# Patient Record
Sex: MALE | Race: WHITE | Employment: FULL TIME | URBAN - METROPOLITAN AREA
[De-identification: names, ages, dates, MRNs, and addresses within clinical notes are randomized per-mention and may not be internally consistent; named-entity substitution may affect disease eponyms.]

---

## 2018-12-19 ENCOUNTER — HOSPITAL ENCOUNTER (OUTPATIENT)
Age: 38
Setting detail: OBSERVATION
Discharge: HOME | End: 2018-12-22
Attending: EMERGENCY MEDICINE | Admitting: INTERNAL MEDICINE
Payer: COMMERCIAL

## 2018-12-19 ENCOUNTER — APPOINTMENT (OUTPATIENT)
Dept: CT IMAGING | Age: 38
End: 2018-12-19
Payer: COMMERCIAL

## 2018-12-19 DIAGNOSIS — G40.919 BREAKTHROUGH SEIZURE (HCC): Primary | ICD-10-CM

## 2018-12-19 PROBLEM — R56.9 SEIZURE (HCC): Status: ACTIVE | Noted: 2018-12-19

## 2018-12-19 LAB
ACETAMINOPHEN LEVEL: <5 UG/ML (ref 15–30)
ALBUMIN SERPL-MCNC: 4.6 GM/DL (ref 3.4–5)
ALCOHOL SCREEN SERUM: <0.01 %WT/VOL
ALP BLD-CCNC: 97 IU/L (ref 40–129)
ALT SERPL-CCNC: 11 U/L (ref 10–40)
ANION GAP SERPL CALCULATED.3IONS-SCNC: 12 MMOL/L (ref 4–16)
AST SERPL-CCNC: 21 IU/L (ref 15–37)
BASOPHILS ABSOLUTE: 0 K/CU MM
BASOPHILS RELATIVE PERCENT: 0.7 % (ref 0–1)
BILIRUB SERPL-MCNC: 0.2 MG/DL (ref 0–1)
BUN BLDV-MCNC: 14 MG/DL (ref 6–23)
CALCIUM SERPL-MCNC: 9.2 MG/DL (ref 8.3–10.6)
CHLORIDE BLD-SCNC: 102 MMOL/L (ref 99–110)
CO2: 27 MMOL/L (ref 21–32)
CREAT SERPL-MCNC: 0.9 MG/DL (ref 0.9–1.3)
DIFFERENTIAL TYPE: ABNORMAL
EOSINOPHILS ABSOLUTE: 0.2 K/CU MM
EOSINOPHILS RELATIVE PERCENT: 3 % (ref 0–3)
GFR AFRICAN AMERICAN: >60 ML/MIN/1.73M2
GFR NON-AFRICAN AMERICAN: >60 ML/MIN/1.73M2
GLUCOSE BLD-MCNC: 103 MG/DL (ref 70–99)
HCT VFR BLD CALC: 44.3 % (ref 42–52)
HEMOGLOBIN: 14.8 GM/DL (ref 13.5–18)
IMMATURE NEUTROPHIL %: 0.3 % (ref 0–0.43)
LYMPHOCYTES ABSOLUTE: 2.2 K/CU MM
LYMPHOCYTES RELATIVE PERCENT: 37.7 % (ref 24–44)
MAGNESIUM: 2.2 MG/DL (ref 1.8–2.4)
MCH RBC QN AUTO: 30.2 PG (ref 27–31)
MCHC RBC AUTO-ENTMCNC: 33.4 % (ref 32–36)
MCV RBC AUTO: 90.4 FL (ref 78–100)
MONOCYTES ABSOLUTE: 0.4 K/CU MM
MONOCYTES RELATIVE PERCENT: 7 % (ref 0–4)
NUCLEATED RBC %: 0 %
PDW BLD-RTO: 12.5 % (ref 11.7–14.9)
PLATELET # BLD: 205 K/CU MM (ref 140–440)
PMV BLD AUTO: 11.1 FL (ref 7.5–11.1)
POTASSIUM SERPL-SCNC: 4.2 MMOL/L (ref 3.5–5.1)
RBC # BLD: 4.9 M/CU MM (ref 4.6–6.2)
SALICYLATE LEVEL: <0.3 MG/DL (ref 15–30)
SEGMENTED NEUTROPHILS ABSOLUTE COUNT: 2.9 K/CU MM
SEGMENTED NEUTROPHILS RELATIVE PERCENT: 51.3 % (ref 36–66)
SODIUM BLD-SCNC: 141 MMOL/L (ref 135–145)
TOTAL IMMATURE NEUTOROPHIL: 0.02 K/CU MM
TOTAL NUCLEATED RBC: 0 K/CU MM
TOTAL PROTEIN: 7.3 GM/DL (ref 6.4–8.2)
TOTAL RETICULOCYTE COUNT: 0.09 K/CU MM
TSH HIGH SENSITIVITY: 2.76 UIU/ML (ref 0.27–4.2)
VALPROIC ACID LEVEL: 73.8 UG/ML (ref 50–100)
WBC # BLD: 5.7 K/CU MM (ref 4–10.5)

## 2018-12-19 PROCEDURE — G0378 HOSPITAL OBSERVATION PER HR: HCPCS

## 2018-12-19 PROCEDURE — 84443 ASSAY THYROID STIM HORMONE: CPT

## 2018-12-19 PROCEDURE — 83735 ASSAY OF MAGNESIUM: CPT

## 2018-12-19 PROCEDURE — 2580000003 HC RX 258: Performed by: INTERNAL MEDICINE

## 2018-12-19 PROCEDURE — 80164 ASSAY DIPROPYLACETIC ACD TOT: CPT

## 2018-12-19 PROCEDURE — 96372 THER/PROPH/DIAG INJ SC/IM: CPT

## 2018-12-19 PROCEDURE — 85025 COMPLETE CBC W/AUTO DIFF WBC: CPT

## 2018-12-19 PROCEDURE — 80053 COMPREHEN METABOLIC PANEL: CPT

## 2018-12-19 PROCEDURE — G0480 DRUG TEST DEF 1-7 CLASSES: HCPCS

## 2018-12-19 PROCEDURE — 70450 CT HEAD/BRAIN W/O DYE: CPT

## 2018-12-19 PROCEDURE — 6360000002 HC RX W HCPCS: Performed by: INTERNAL MEDICINE

## 2018-12-19 PROCEDURE — 99285 EMERGENCY DEPT VISIT HI MDM: CPT

## 2018-12-19 PROCEDURE — 93005 ELECTROCARDIOGRAM TRACING: CPT | Performed by: EMERGENCY MEDICINE

## 2018-12-19 RX ORDER — DIVALPROEX SODIUM 500 MG/1
500 TABLET, EXTENDED RELEASE ORAL DAILY
Status: DISCONTINUED | OUTPATIENT
Start: 2018-12-20 | End: 2018-12-20

## 2018-12-19 RX ORDER — SODIUM CHLORIDE 0.9 % (FLUSH) 0.9 %
10 SYRINGE (ML) INJECTION EVERY 12 HOURS SCHEDULED
Status: DISCONTINUED | OUTPATIENT
Start: 2018-12-19 | End: 2018-12-22 | Stop reason: HOSPADM

## 2018-12-19 RX ORDER — ONDANSETRON 2 MG/ML
4 INJECTION INTRAMUSCULAR; INTRAVENOUS EVERY 6 HOURS PRN
Status: DISCONTINUED | OUTPATIENT
Start: 2018-12-19 | End: 2018-12-22 | Stop reason: HOSPADM

## 2018-12-19 RX ORDER — SODIUM CHLORIDE 0.9 % (FLUSH) 0.9 %
10 SYRINGE (ML) INJECTION PRN
Status: DISCONTINUED | OUTPATIENT
Start: 2018-12-19 | End: 2018-12-22 | Stop reason: HOSPADM

## 2018-12-19 RX ORDER — LORAZEPAM 2 MG/ML
2 INJECTION INTRAMUSCULAR EVERY 4 HOURS PRN
Status: DISCONTINUED | OUTPATIENT
Start: 2018-12-19 | End: 2018-12-22 | Stop reason: HOSPADM

## 2018-12-19 RX ORDER — SODIUM CHLORIDE 9 MG/ML
INJECTION, SOLUTION INTRAVENOUS CONTINUOUS
Status: DISCONTINUED | OUTPATIENT
Start: 2018-12-19 | End: 2018-12-22 | Stop reason: HOSPADM

## 2018-12-19 RX ORDER — AMOXICILLIN 500 MG/1
500 CAPSULE ORAL 4 TIMES DAILY
Refills: 0 | Status: ON HOLD | COMMUNITY
Start: 2018-12-14 | End: 2018-12-21 | Stop reason: HOSPADM

## 2018-12-19 RX ORDER — ALLOPURINOL 300 MG/1
300 TABLET ORAL 2 TIMES DAILY
COMMUNITY
End: 2019-05-11 | Stop reason: SDUPTHER

## 2018-12-19 RX ADMIN — SODIUM CHLORIDE, PRESERVATIVE FREE 10 ML: 5 INJECTION INTRAVENOUS at 22:00

## 2018-12-19 RX ADMIN — SODIUM CHLORIDE: 9 INJECTION, SOLUTION INTRAVENOUS at 21:57

## 2018-12-19 RX ADMIN — ENOXAPARIN SODIUM 40 MG: 40 INJECTION SUBCUTANEOUS at 22:00

## 2018-12-19 ASSESSMENT — PAIN SCALES - GENERAL
PAINLEVEL_OUTOF10: 0
PAINLEVEL_OUTOF10: 0

## 2018-12-19 NOTE — PROGRESS NOTES
Medication History  [facility]    Patient Name: Allen Collins 1980     Medication history has been completed by: Kedar Simmons CPhT    Source(s) of information: patient and insurance claims     Primary Care Physician: Isabella Ling PA-C     Pharmacy: [facility]    Allergies as of 12/19/2018    (No Known Allergies)        Prior to Admission medications    Medication Sig Start Date End Date Taking? Authorizing Provider   amoxicillin (AMOXIL) 500 MG capsule Take 500 mg by mouth 4 times daily 12/14/18  Yes Historical Provider, MD   divalproex (DEPAKOTE ER) 1500 MG ER tablet Take 1500 mg by mouth daily    Yes Historical Provider, MD   ibuprofen (ADVIL;MOTRIN) 200 MG tablet Take 200 mg by mouth every 6 hours as needed for Pain  12/19/18  Historical Provider, MD   topiramate (TOPAMAX) 50 MG tablet Take 50 mg by mouth nightly  12/19/18  Historical Provider, MD       Medications added or changed (ex.  new medication, dosage change, interval change, formulation change):  · Amoxicillin 500 mg 4 times daily (added)  · Divalproex ER 1500 mg daily dosage clarified  · Allopurinol 300 mg BID (added)    Medications removed from list (include reason, ex. noncompliance, medication cost, therapy complete etc.):   · IBU patient states he does not take this  · Topiramate no data claims listed    Medications requiring reconciliation with provider:   ·  Divalproex ER 1500 mg daily dosage clarified  · Allopurinol 300 mg BID (added)    Other Comments:  · Medication list reviewed with patient along with insurance claims  · Patient states he takes allopurinol last fill date 08/31/18     To my knowledge the above medication history is accurate as of 12/19/2018 5:14 PM.   Kedar Simmons CPhT   12/19/2018 5:14 PM

## 2018-12-19 NOTE — ED PROVIDER NOTES
-14 degrees    T Axis -12 degrees    Diagnosis       Normal sinus rhythm  Moderate voltage criteria for LVH, may be normal variant  Inferior infarct , age undetermined  Abnormal ECG  No previous ECGs available        Radiographs (if obtained):  [] The following radiograph was interpreted by myself in the absence of a radiologist:   [] Radiologist's Report Reviewed:  CT Head WO Contrast   Final Result   1. No acute intracranial abnormality. EKG (if obtained): (All EKG's are interpreted by myself in the absence of a cardiologist)    Chart review shows recent radiographs:  Ct Head Wo Contrast    Result Date: 12/19/2018  EXAMINATION: CT OF THE HEAD WITHOUT CONTRAST  12/19/2018 2:15 pm TECHNIQUE: CT of the head was performed without the administration of intravenous contrast. Dose modulation, iterative reconstruction, and/or weight based adjustment of the mA/kV was utilized to reduce the radiation dose to as low as reasonably achievable. COMPARISON: 11/02/2007. HISTORY: ORDERING SYSTEM PROVIDED HISTORY: possible seizure TECHNOLOGIST PROVIDED HISTORY: Has a \"code stroke\" or \"stroke alert\" been called? ->No Ordering Physician Provided Reason for Exam: POSSIBLE SEIZURE Acuity: Unknown Type of Exam: Unknown Additional signs and symptoms: PT STATES HE HAS HAD SEIZURES IN THE PAST Relevant Medical/Surgical History: NONE FINDINGS: BRAIN/VENTRICLES: There is no acute intracranial hemorrhage, mass effect or midline shift. No abnormal extra-axial fluid collection. The gray-white differentiation is maintained without evidence of an acute infarct. There is no evidence of hydrocephalus. ORBITS: The visualized portion of the orbits demonstrate no acute abnormality. SINUSES: The visualized paranasal sinuses and mastoid air cells demonstrate no acute abnormality. SOFT TISSUES/SKULL:  No acute abnormality of the visualized skull or soft tissues. 1.No acute intracranial abnormality.        MDM:  Patient here for possible

## 2018-12-19 NOTE — H&P
History and Physical      Name:  Imelda Akhtar /Age/Sex: 1980  (45 y.o. male)   MRN & CSN:  [id number] & [id number] Admission Date/Time: 2018  1:15 PM   Location:  ED26/ED-26 PCP: Gilda Coelho PA-C       Hospital Day: 1    Assessment and Plan:   Imelda Akhtar is a 45 y.o.  male  who presents with Seizure ([facility] [address]    1. Seizure Disorder: has been having episodes of blank stares. No real jerky movements. On Depakote. Last seizure episode was 10 years ago. Rule out trigger like infection and electrolyte abnormalities. May need EEG as outpatient. Neurology as outpatient   2. Acute Encephalopathy: presented with blank stares. Could be post ictal. CT Head no acute issues. UA. TSH. Neurology on consult    Diet     DVT Prophylaxis [x] Lovenox, []  Heparin, [] SCDs, [] Warfarin  [] NOAC     GI Prophylaxis [] PPI,  [] H2 Blocker,  [] Carafate,  [x] Diet/Tube Feeds   Code Status No Order   MDM [] Low, [x] Moderate,[]  High     History of Present Illness:     Chief Complaint: Seizure ([address]  Imelda Akhtar is a 45 y.o.  male, with past medical history significant for seizure disorder, who presented to the ED from home due blank started. He is a poor historian. History obtained from the chart. The present condition started in the last few days as episodes of blanks stares. He has no jerky movements. He denied urinary or bowel incontinence. He was also noted to be forgetful, missing his appointments. The patient was brought to the ED and was subsequently admitted. At the ED, vital signs were stable. He has no electrolyte abnormalities. CT Head with no acute intracranial process. Ten point ROS reviewed negative, unless as noted above    Objective:   No intake or output data in the 24 hours ending 18 1718   Vitals:   Vitals:    18 1602   BP: (!) 99/56   Pulse: 60   Resp: 14   Temp:    SpO2:      Physical Exam:   GEN Awake male, sitting upright in bed in no apparent distress.  Appears given

## 2018-12-20 LAB
ALBUMIN SERPL-MCNC: 4.6 GM/DL (ref 3.4–5)
ALP BLD-CCNC: 93 IU/L (ref 40–128)
ALT SERPL-CCNC: 10 U/L (ref 10–40)
ANION GAP SERPL CALCULATED.3IONS-SCNC: 14 MMOL/L (ref 4–16)
AST SERPL-CCNC: 18 IU/L (ref 15–37)
BASOPHILS ABSOLUTE: 0 K/CU MM
BASOPHILS RELATIVE PERCENT: 0.3 % (ref 0–1)
BILIRUB SERPL-MCNC: 0.2 MG/DL (ref 0–1)
BUN BLDV-MCNC: 17 MG/DL (ref 6–23)
CALCIUM SERPL-MCNC: 9.1 MG/DL (ref 8.3–10.6)
CHLORIDE BLD-SCNC: 104 MMOL/L (ref 99–110)
CO2: 26 MMOL/L (ref 21–32)
CREAT SERPL-MCNC: 1 MG/DL (ref 0.9–1.3)
DIFFERENTIAL TYPE: ABNORMAL
EOSINOPHILS ABSOLUTE: 0.2 K/CU MM
EOSINOPHILS RELATIVE PERCENT: 3.2 % (ref 0–3)
GFR AFRICAN AMERICAN: >60 ML/MIN/1.73M2
GFR NON-AFRICAN AMERICAN: >60 ML/MIN/1.73M2
GLUCOSE BLD-MCNC: 104 MG/DL (ref 70–99)
HCT VFR BLD CALC: 42.6 % (ref 42–52)
HEMOGLOBIN: 14 GM/DL (ref 13.5–18)
IMMATURE NEUTROPHIL %: 0.2 % (ref 0–0.43)
LYMPHOCYTES ABSOLUTE: 2.5 K/CU MM
LYMPHOCYTES RELATIVE PERCENT: 42.2 % (ref 24–44)
MCH RBC QN AUTO: 30.1 PG (ref 27–31)
MCHC RBC AUTO-ENTMCNC: 32.9 % (ref 32–36)
MCV RBC AUTO: 91.6 FL (ref 78–100)
MONOCYTES ABSOLUTE: 0.4 K/CU MM
MONOCYTES RELATIVE PERCENT: 7.3 % (ref 0–4)
NUCLEATED RBC %: 0 %
PDW BLD-RTO: 12.4 % (ref 11.7–14.9)
PLATELET # BLD: 200 K/CU MM (ref 140–440)
PMV BLD AUTO: 11.4 FL (ref 7.5–11.1)
POTASSIUM SERPL-SCNC: 4.4 MMOL/L (ref 3.5–5.1)
RBC # BLD: 4.65 M/CU MM (ref 4.6–6.2)
SEGMENTED NEUTROPHILS ABSOLUTE COUNT: 2.8 K/CU MM
SEGMENTED NEUTROPHILS RELATIVE PERCENT: 46.8 % (ref 36–66)
SODIUM BLD-SCNC: 144 MMOL/L (ref 135–145)
TOTAL IMMATURE NEUTOROPHIL: 0.01 K/CU MM
TOTAL NUCLEATED RBC: 0 K/CU MM
TOTAL PROTEIN: 6.6 GM/DL (ref 6.4–8.2)
WBC # BLD: 5.9 K/CU MM (ref 4–10.5)

## 2018-12-20 PROCEDURE — 93010 ELECTROCARDIOGRAM REPORT: CPT | Performed by: INTERNAL MEDICINE

## 2018-12-20 PROCEDURE — G0378 HOSPITAL OBSERVATION PER HR: HCPCS

## 2018-12-20 PROCEDURE — 85025 COMPLETE CBC W/AUTO DIFF WBC: CPT

## 2018-12-20 PROCEDURE — 2580000003 HC RX 258: Performed by: INTERNAL MEDICINE

## 2018-12-20 PROCEDURE — 94761 N-INVAS EAR/PLS OXIMETRY MLT: CPT

## 2018-12-20 PROCEDURE — 36415 COLL VENOUS BLD VENIPUNCTURE: CPT

## 2018-12-20 PROCEDURE — 80053 COMPREHEN METABOLIC PANEL: CPT

## 2018-12-20 PROCEDURE — 6370000000 HC RX 637 (ALT 250 FOR IP): Performed by: INTERNAL MEDICINE

## 2018-12-20 PROCEDURE — 6370000000 HC RX 637 (ALT 250 FOR IP): Performed by: PSYCHIATRY & NEUROLOGY

## 2018-12-20 RX ORDER — DIVALPROEX SODIUM 500 MG/1
2000 TABLET, EXTENDED RELEASE ORAL DAILY
Status: DISCONTINUED | OUTPATIENT
Start: 2018-12-20 | End: 2018-12-21

## 2018-12-20 RX ADMIN — SODIUM CHLORIDE: 9 INJECTION, SOLUTION INTRAVENOUS at 19:21

## 2018-12-20 RX ADMIN — DIVALPROEX SODIUM 500 MG: 500 TABLET, FILM COATED, EXTENDED RELEASE ORAL at 09:23

## 2018-12-20 RX ADMIN — DIVALPROEX SODIUM 2000 MG: 500 TABLET, FILM COATED, EXTENDED RELEASE ORAL at 16:43

## 2018-12-20 RX ADMIN — SODIUM CHLORIDE: 9 INJECTION, SOLUTION INTRAVENOUS at 09:23

## 2018-12-20 ASSESSMENT — PAIN SCALES - GENERAL
PAINLEVEL_OUTOF10: 0

## 2018-12-20 NOTE — PLAN OF CARE
Problem: Infection:  Goal: Will remain free from infection  Will remain free from infection   Outcome: Ongoing      Problem: Safety:  Goal: Free from accidental physical injury  Free from accidental physical injury   Outcome: Ongoing    Goal: Free from intentional harm  Free from intentional harm   Outcome: Ongoing      Problem: Daily Care:  Goal: Daily care needs are met  Daily care needs are met   Outcome: Ongoing      Problem: Pain:  Goal: Patient's pain/discomfort is manageable  Patient's pain/discomfort is manageable   Outcome: Ongoing      Problem: Skin Integrity:  Goal: Skin integrity will stabilize  Skin integrity will stabilize   Outcome: Ongoing      Problem: Discharge Planning:  Goal: Patients continuum of care needs are met  Patients continuum of care needs are met   Outcome: Ongoing      Problem: Health Behavior:  Goal: Ability to manage health-related needs will improve  Ability to manage health-related needs will improve   Outcome: Ongoing      Problem: Physical Regulation:  Goal: Signs of adequate cerebral perfusion will increase  Signs of adequate cerebral perfusion will increase   Outcome: Ongoing    Goal: Ability to maintain a stable neurologic state will improve  Ability to maintain a stable neurologic state will improve   Outcome: Ongoing      Problem: Falls - Risk of:  Goal: Will remain free from falls  Will remain free from falls   Outcome: Ongoing    Goal: Absence of physical injury  Absence of physical injury   Outcome: Ongoing

## 2018-12-20 NOTE — CONSULTS
family history indicates:  Problem: Arthritis      Relation: Mother       Age of Onset: (Not Specified)   Problem: Lupus      Relation: Mother       Age of Onset: (Not Specified)   Problem: Diabetes      Relation: Father       Age of Onset: (Not Specified)   Problem: Coronary Art Dis      Relation: Father       Age of Onset: (Not Specified)   Problem: Colon Cancer      Relation: Paternal Uncle       Age of Onset: (Not Specified)       REVIEW OF SYSTEMS:  CONSTITUTIONAL:  negative  HEENT:  negative  RESPIRATORY:  negative  CARDIOVASCULAR:  negative  GASTROINTESTINAL:  negative  GENITOURINARY:  negative  MUSCULOSKELETAL:  negative  BEHAVIOR/PSYCH:  Negative    ROS neg    Family hx neg    PHYSICAL EXAM  ------------------------  Vitals:  /65   Pulse 68   Temp 98.3 °F (36.8 °C) (Oral)   Resp 12   Ht 5' 6\" (1.676 m)   Wt 190 lb 12.8 oz (86.5 kg)   SpO2 95%   BMI 30.80 kg/m²      General:  drowsy oriented X 3. Well developed, well nourished, well groomed. No apparent distress. HEENT:  Normocephalic, atraumatic. Pupils equal, round, reactive to light. No scleral icterus. No conjunctival injection. Normal lips, teeth, and gums. No nasal discharge. Neck:  Supple  Heart:  RRR, no murmurs, gallops, rubs  Lungs:  CTA bilaterally, bilat symmetrical expansion, no wheeze, rales, or rhonchi  Abdomen: Bowel sounds present, soft, nontender, no masses, no organomegaly, no peritoneal signs  Extremities:  No clubbing, cyanosis, or edema  Skin:  Warm and dry, no open lesions or rash  Breast: deferred  Rectal: deferred  Genitalia:  deferred    NEUROLOGICAL EXAM  ---------------------------------    Mental Status Exam:             drowsy and oriented times three,follows commands,speech and language mildly confused and drowsy    Cranial [id number] Intact.         Cranial nerve II           Visual acuity:  normal                 Cranial nerve III           Pupils:  equal, round, reactive to light      Cranial nerves III, IV, VI           Extraocular Movements: intact      Cranial nerve V           Facial sensation:  intact      Cranial nerve VII           Facial strength: intact      Cranial nerve VIII           Hearing:  intact      Cranial nerve IX           Palate:  intact      Cranial nerve XI         Shoulder shrug:  intact      Cranial nerve XII          Tongue movement:  normal    Motor:    Drift:  absent  Motor exam is symmetrical 5 out of 5 all extremities bilaterally  Tone:  normal  Abnormal Movements:  Absent    DTRs-2+ biceps,triceps,brachioradialis,knee jerks and ankle jerks bilaterally symmetrical.  Toes-downgoing bilaterally            Sensory:normal sensation              CBC with Differential:  Lab Results       Component                Value               Date                       WBC                      5.9                 12/20/2018                 RBC                      4.65                12/20/2018                 HGB                      14.0                12/20/2018                 HCT                      42.6                12/20/2018                 PLT                      200                 12/20/2018                 MCV                      91.6                12/20/2018                 MCH                      30.1                12/20/2018                 MCHC                     32.9                12/20/2018                 RDW                      12.4                12/20/2018                 SEGSPCT                  46.8                12/20/2018                 LYMPHOPCT                42.2                12/20/2018                 MONOPCT                  7.3                 12/20/2018                 BASOPCT                  0.3                 12/20/2018                 MONOSABS                 0.4                 12/20/2018                 LYMPHSABS                2.5                 12/20/2018                 EOSABS                   0.2                 12/20/2018

## 2018-12-20 NOTE — CARE COORDINATION
Patient screened for discharge needs with no needs identified at this time. Pt is from home with family, has PCP, insurance with RX coverage and was independent prior to admission. However, CM available if needs arise.

## 2018-12-21 ENCOUNTER — APPOINTMENT (OUTPATIENT)
Dept: MRI IMAGING | Age: 38
End: 2018-12-21
Payer: COMMERCIAL

## 2018-12-21 LAB
ALBUMIN SERPL-MCNC: 4 GM/DL (ref 3.4–5)
ALP BLD-CCNC: 80 IU/L (ref 40–129)
ALT SERPL-CCNC: 8 U/L (ref 10–40)
AMMONIA: 60 UMOL/L (ref 16–60)
AMPHETAMINES: NEGATIVE
ANION GAP SERPL CALCULATED.3IONS-SCNC: 10 MMOL/L (ref 4–16)
AST SERPL-CCNC: 14 IU/L (ref 15–37)
BACTERIA: NEGATIVE /HPF
BARBITURATE SCREEN URINE: NEGATIVE
BASOPHILS ABSOLUTE: 0 K/CU MM
BASOPHILS RELATIVE PERCENT: 0.6 % (ref 0–1)
BENZODIAZEPINE SCREEN, URINE: NEGATIVE
BILIRUB SERPL-MCNC: 0.2 MG/DL (ref 0–1)
BILIRUBIN URINE: NEGATIVE MG/DL
BLOOD, URINE: NEGATIVE
BUN BLDV-MCNC: 12 MG/DL (ref 6–23)
CALCIUM SERPL-MCNC: 8.6 MG/DL (ref 8.3–10.6)
CANNABINOID SCREEN URINE: NEGATIVE
CHLORIDE BLD-SCNC: 107 MMOL/L (ref 99–110)
CLARITY: CLEAR
CO2: 27 MMOL/L (ref 21–32)
COCAINE METABOLITE: NEGATIVE
COLOR: YELLOW
CREAT SERPL-MCNC: 0.9 MG/DL (ref 0.9–1.3)
DIFFERENTIAL TYPE: ABNORMAL
EOSINOPHILS ABSOLUTE: 0.2 K/CU MM
EOSINOPHILS RELATIVE PERCENT: 3.1 % (ref 0–3)
FOLATE: 10.6 NG/ML (ref 3.1–17.5)
GFR AFRICAN AMERICAN: >60 ML/MIN/1.73M2
GFR NON-AFRICAN AMERICAN: >60 ML/MIN/1.73M2
GLUCOSE BLD-MCNC: 94 MG/DL (ref 70–99)
GLUCOSE, URINE: NEGATIVE MG/DL
HCT VFR BLD CALC: 38.4 % (ref 42–52)
HEMOGLOBIN: 12.7 GM/DL (ref 13.5–18)
IMMATURE NEUTROPHIL %: 0.4 % (ref 0–0.43)
KETONES, URINE: NEGATIVE MG/DL
LEUKOCYTE ESTERASE, URINE: NEGATIVE
LYMPHOCYTES ABSOLUTE: 2.5 K/CU MM
LYMPHOCYTES RELATIVE PERCENT: 47.8 % (ref 24–44)
MCH RBC QN AUTO: 30.2 PG (ref 27–31)
MCHC RBC AUTO-ENTMCNC: 33.1 % (ref 32–36)
MCV RBC AUTO: 91.4 FL (ref 78–100)
MONOCYTES ABSOLUTE: 0.3 K/CU MM
MONOCYTES RELATIVE PERCENT: 6.1 % (ref 0–4)
MUCUS: ABNORMAL HPF
NITRITE URINE, QUANTITATIVE: NEGATIVE
NUCLEATED RBC %: 0 %
OPIATES, URINE: NEGATIVE
OXYCODONE: NEGATIVE
PDW BLD-RTO: 12.4 % (ref 11.7–14.9)
PH, URINE: 5 (ref 5–8)
PHENCYCLIDINE, URINE: NEGATIVE
PLATELET # BLD: 175 K/CU MM (ref 140–440)
PMV BLD AUTO: 11.2 FL (ref 7.5–11.1)
POTASSIUM SERPL-SCNC: 4.3 MMOL/L (ref 3.5–5.1)
PROTEIN UA: NEGATIVE MG/DL
RBC # BLD: 4.2 M/CU MM (ref 4.6–6.2)
RBC URINE: 2 /HPF (ref 0–3)
SEGMENTED NEUTROPHILS ABSOLUTE COUNT: 2.2 K/CU MM
SEGMENTED NEUTROPHILS RELATIVE PERCENT: 42 % (ref 36–66)
SODIUM BLD-SCNC: 144 MMOL/L (ref 135–145)
SPECIFIC GRAVITY UA: 1.01 (ref 1–1.03)
TOTAL IMMATURE NEUTOROPHIL: 0.02 K/CU MM
TOTAL NUCLEATED RBC: 0 K/CU MM
TOTAL PROTEIN: 5.9 GM/DL (ref 6.4–8.2)
TRICHOMONAS: ABNORMAL /HPF
UROBILINOGEN, URINE: NORMAL MG/DL (ref 0.2–1)
VITAMIN B-12: 786 PG/ML (ref 211–911)
WBC # BLD: 5.2 K/CU MM (ref 4–10.5)
WBC UA: <1 /HPF (ref 0–2)

## 2018-12-21 PROCEDURE — 6360000002 HC RX W HCPCS: Performed by: PSYCHIATRY & NEUROLOGY

## 2018-12-21 PROCEDURE — 82746 ASSAY OF FOLIC ACID SERUM: CPT

## 2018-12-21 PROCEDURE — 96372 THER/PROPH/DIAG INJ SC/IM: CPT

## 2018-12-21 PROCEDURE — G0378 HOSPITAL OBSERVATION PER HR: HCPCS

## 2018-12-21 PROCEDURE — 80307 DRUG TEST PRSMV CHEM ANLYZR: CPT

## 2018-12-21 PROCEDURE — 85025 COMPLETE CBC W/AUTO DIFF WBC: CPT

## 2018-12-21 PROCEDURE — 6370000000 HC RX 637 (ALT 250 FOR IP): Performed by: PSYCHIATRY & NEUROLOGY

## 2018-12-21 PROCEDURE — 6360000002 HC RX W HCPCS: Performed by: INTERNAL MEDICINE

## 2018-12-21 PROCEDURE — 82607 VITAMIN B-12: CPT

## 2018-12-21 PROCEDURE — 36415 COLL VENOUS BLD VENIPUNCTURE: CPT

## 2018-12-21 PROCEDURE — 2580000003 HC RX 258: Performed by: INTERNAL MEDICINE

## 2018-12-21 PROCEDURE — 80053 COMPREHEN METABOLIC PANEL: CPT

## 2018-12-21 PROCEDURE — 96374 THER/PROPH/DIAG INJ IV PUSH: CPT

## 2018-12-21 PROCEDURE — 82140 ASSAY OF AMMONIA: CPT

## 2018-12-21 PROCEDURE — 70551 MRI BRAIN STEM W/O DYE: CPT

## 2018-12-21 PROCEDURE — 81001 URINALYSIS AUTO W/SCOPE: CPT

## 2018-12-21 PROCEDURE — 96376 TX/PRO/DX INJ SAME DRUG ADON: CPT

## 2018-12-21 PROCEDURE — 94761 N-INVAS EAR/PLS OXIMETRY MLT: CPT

## 2018-12-21 RX ORDER — DIVALPROEX SODIUM 500 MG/1
2000 TABLET, EXTENDED RELEASE ORAL
Status: DISCONTINUED | OUTPATIENT
Start: 2018-12-22 | End: 2018-12-22 | Stop reason: HOSPADM

## 2018-12-21 RX ORDER — LORAZEPAM 2 MG/ML
2 INJECTION INTRAMUSCULAR 2 TIMES DAILY
Status: DISCONTINUED | OUTPATIENT
Start: 2018-12-21 | End: 2018-12-22 | Stop reason: HOSPADM

## 2018-12-21 RX ORDER — DIVALPROEX SODIUM 500 MG/1
2000 TABLET, EXTENDED RELEASE ORAL DAILY
Qty: 30 TABLET | Refills: 3 | Status: SHIPPED | OUTPATIENT
Start: 2018-12-22 | End: 2018-12-22 | Stop reason: HOSPADM

## 2018-12-21 RX ADMIN — SODIUM CHLORIDE: 9 INJECTION, SOLUTION INTRAVENOUS at 21:52

## 2018-12-21 RX ADMIN — ENOXAPARIN SODIUM 40 MG: 40 INJECTION SUBCUTANEOUS at 21:52

## 2018-12-21 RX ADMIN — SODIUM CHLORIDE, PRESERVATIVE FREE 10 ML: 5 INJECTION INTRAVENOUS at 21:52

## 2018-12-21 RX ADMIN — DIVALPROEX SODIUM 2000 MG: 500 TABLET, FILM COATED, EXTENDED RELEASE ORAL at 09:44

## 2018-12-21 RX ADMIN — SODIUM CHLORIDE, PRESERVATIVE FREE 10 ML: 5 INJECTION INTRAVENOUS at 00:09

## 2018-12-21 RX ADMIN — SODIUM CHLORIDE: 9 INJECTION, SOLUTION INTRAVENOUS at 05:52

## 2018-12-21 RX ADMIN — LORAZEPAM 2 MG: 2 INJECTION, SOLUTION INTRAMUSCULAR; INTRAVENOUS at 07:06

## 2018-12-21 RX ADMIN — LORAZEPAM 2 MG: 2 INJECTION, SOLUTION INTRAMUSCULAR; INTRAVENOUS at 16:55

## 2018-12-21 RX ADMIN — ENOXAPARIN SODIUM 40 MG: 40 INJECTION SUBCUTANEOUS at 00:09

## 2018-12-21 NOTE — PROGRESS NOTES
This nurse spoke with the pt wife multiple times this shift and updated her regarding his condition, any tests that were done, and all meds that he has been given. Pt wife has been very upset, saying that pt has not been given the correct care. Dr. Lenny Menchaca and Dr. Ralph Whitfield have been informed and have both spoken with the pt wife.

## 2018-12-21 NOTE — DISCHARGE SUMMARY
11.2 oz (88.8 kg)   SpO2 96%   BMI 31.59 kg/m²            GEN    Awake male, sitting upright in bed in no apparent distress. Appears given age. EYES   Pupils are equally round.  No scleral erythema, discharge, or conjunctivitis. HENT  Mucous membranes are moist. Oral pharynx without exudates, no evidence of thrush. NECK  Supple, no apparent thyromegaly or masses. RESP  Clear to auscultation, no wheezes, rales or rhonchi.  Symmetric chest movement while on room air. CARDIO/VASC           S1/S2 auscultated. Regular rate without appreciable murmurs, rubs, or gallops. No JVD or carotid bruits. Peripheral pulses equal bilaterally and palpable. No peripheral edema. Ulyess Anis is soft without significant tenderness, masses, or guarding. Bowel sounds are normoactive. Rectal exam deferred. MSK    No gross joint deformities. SKIN    Normal coloration, warm, dry. NEURO           Cranial nerves appear grossly intact, normal speech, no lateralizing weakness. PSYCH            Awake, slow to respond. BMP/CBC  Recent Labs      12/19/18   1336  12/20/18   0342  12/21/18   0429   NA  141  144  144   K  4.2  4.4  4.3   CL  102  104  107   CO2  27  26  27   BUN  14  17  12   CREATININE  0.9  1.0  0.9   WBC  5.7  5.9  5.2   HCT  44.3  42.6  38.4*   PLT  205  200  175       IMAGING:  Ct Head Wo Contrast    Result Date: 12/19/2018  EXAMINATION: CT OF THE HEAD WITHOUT CONTRAST  12/19/2018 2:15 pm TECHNIQUE: CT of the head was performed without the administration of intravenous contrast. Dose modulation, iterative reconstruction, and/or weight based adjustment of the mA/kV was utilized to reduce the radiation dose to as low as reasonably achievable. COMPARISON: 11/02/2007. HISTORY: ORDERING SYSTEM PROVIDED HISTORY: possible seizure TECHNOLOGIST PROVIDED HISTORY: Has a \"code stroke\" or \"stroke alert\" been called? ->No Ordering Physician Provided Reason for Exam: POSSIBLE SEIZURE Acuity: Unknown Type of Exam: Unknown

## 2018-12-22 VITALS
HEIGHT: 66 IN | HEART RATE: 74 BPM | BODY MASS INDEX: 31.45 KG/M2 | OXYGEN SATURATION: 97 % | DIASTOLIC BLOOD PRESSURE: 80 MMHG | TEMPERATURE: 97.7 F | SYSTOLIC BLOOD PRESSURE: 123 MMHG | RESPIRATION RATE: 13 BRPM | WEIGHT: 195.7 LBS

## 2018-12-22 LAB
ALBUMIN SERPL-MCNC: 4.1 GM/DL (ref 3.4–5)
ALP BLD-CCNC: 77 IU/L (ref 40–128)
ALT SERPL-CCNC: 9 U/L (ref 10–40)
ANION GAP SERPL CALCULATED.3IONS-SCNC: 11 MMOL/L (ref 4–16)
AST SERPL-CCNC: 15 IU/L (ref 15–37)
BASOPHILS ABSOLUTE: 0 K/CU MM
BASOPHILS RELATIVE PERCENT: 0.4 % (ref 0–1)
BILIRUB SERPL-MCNC: 0.2 MG/DL (ref 0–1)
BUN BLDV-MCNC: 9 MG/DL (ref 6–23)
CALCIUM SERPL-MCNC: 8.5 MG/DL (ref 8.3–10.6)
CHLORIDE BLD-SCNC: 105 MMOL/L (ref 99–110)
CO2: 26 MMOL/L (ref 21–32)
CREAT SERPL-MCNC: 0.8 MG/DL (ref 0.9–1.3)
DIFFERENTIAL TYPE: ABNORMAL
EOSINOPHILS ABSOLUTE: 0.2 K/CU MM
EOSINOPHILS RELATIVE PERCENT: 2.7 % (ref 0–3)
GFR AFRICAN AMERICAN: >60 ML/MIN/1.73M2
GFR NON-AFRICAN AMERICAN: >60 ML/MIN/1.73M2
GLUCOSE BLD-MCNC: 92 MG/DL (ref 70–99)
HCT VFR BLD CALC: 39.7 % (ref 42–52)
HEMOGLOBIN: 13 GM/DL (ref 13.5–18)
IMMATURE NEUTROPHIL %: 0.5 % (ref 0–0.43)
LYMPHOCYTES ABSOLUTE: 2.6 K/CU MM
LYMPHOCYTES RELATIVE PERCENT: 46.5 % (ref 24–44)
MCH RBC QN AUTO: 29.7 PG (ref 27–31)
MCHC RBC AUTO-ENTMCNC: 32.7 % (ref 32–36)
MCV RBC AUTO: 90.8 FL (ref 78–100)
MONOCYTES ABSOLUTE: 0.3 K/CU MM
MONOCYTES RELATIVE PERCENT: 5.2 % (ref 0–4)
NUCLEATED RBC %: 0 %
PDW BLD-RTO: 12.3 % (ref 11.7–14.9)
PLATELET # BLD: 183 K/CU MM (ref 140–440)
PMV BLD AUTO: 10.7 FL (ref 7.5–11.1)
POTASSIUM SERPL-SCNC: 4.4 MMOL/L (ref 3.5–5.1)
RBC # BLD: 4.37 M/CU MM (ref 4.6–6.2)
SEGMENTED NEUTROPHILS ABSOLUTE COUNT: 2.5 K/CU MM
SEGMENTED NEUTROPHILS RELATIVE PERCENT: 44.7 % (ref 36–66)
SODIUM BLD-SCNC: 142 MMOL/L (ref 135–145)
TOTAL IMMATURE NEUTOROPHIL: 0.03 K/CU MM
TOTAL NUCLEATED RBC: 0 K/CU MM
TOTAL PROTEIN: 5.9 GM/DL (ref 6.4–8.2)
VALPROIC ACID LEVEL: 105.8 UG/ML (ref 50–100)
WBC # BLD: 5.6 K/CU MM (ref 4–10.5)

## 2018-12-22 PROCEDURE — 36415 COLL VENOUS BLD VENIPUNCTURE: CPT

## 2018-12-22 PROCEDURE — 85025 COMPLETE CBC W/AUTO DIFF WBC: CPT

## 2018-12-22 PROCEDURE — 80053 COMPREHEN METABOLIC PANEL: CPT

## 2018-12-22 PROCEDURE — G0378 HOSPITAL OBSERVATION PER HR: HCPCS

## 2018-12-22 PROCEDURE — 80164 ASSAY DIPROPYLACETIC ACD TOT: CPT

## 2018-12-22 RX ORDER — DIVALPROEX SODIUM 500 MG/1
2000 TABLET, EXTENDED RELEASE ORAL
Qty: 30 TABLET | Refills: 3 | Status: SHIPPED | OUTPATIENT
Start: 2018-12-22 | End: 2019-09-09
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
## 2018-12-24 LAB
EKG ATRIAL RATE: 62 BPM
EKG DIAGNOSIS: NORMAL
EKG P AXIS: 25 DEGREES
EKG P-R INTERVAL: 168 MS
EKG Q-T INTERVAL: 396 MS
EKG QRS DURATION: 80 MS
EKG QTC CALCULATION (BAZETT): 401 MS
EKG R AXIS: -14 DEGREES
EKG T AXIS: -12 DEGREES
EKG VENTRICULAR RATE: 62 BPM

## 2019-05-13 RX ORDER — ALLOPURINOL 300 MG/1
300 TABLET ORAL DAILY
Qty: 30 TABLET | Refills: 0 | Status: SHIPPED | OUTPATIENT
Start: 2019-05-13 | End: 2019-06-20 | Stop reason: SDUPTHER

## 2019-07-29 ENCOUNTER — TELEPHONE (OUTPATIENT)
Dept: FAMILY MEDICINE CLINIC | Age: 39
End: 2019-07-29

## 2019-07-30 RX ORDER — IBUPROFEN 800 MG/1
800 TABLET ORAL DAILY PRN
COMMUNITY
End: 2020-01-07 | Stop reason: SDUPTHER

## 2019-07-30 RX ORDER — DIVALPROEX SODIUM 500 MG/1
500 TABLET, EXTENDED RELEASE ORAL NIGHTLY
COMMUNITY
End: 2020-05-08 | Stop reason: SDUPTHER

## 2019-08-14 ENCOUNTER — OFFICE VISIT (OUTPATIENT)
Dept: FAMILY MEDICINE CLINIC | Age: 39
End: 2019-08-14
Payer: COMMERCIAL

## 2019-08-14 VITALS
WEIGHT: 197 LBS | OXYGEN SATURATION: 96 % | SYSTOLIC BLOOD PRESSURE: 138 MMHG | DIASTOLIC BLOOD PRESSURE: 82 MMHG | HEART RATE: 76 BPM | BODY MASS INDEX: 30.92 KG/M2 | HEIGHT: 67 IN

## 2019-08-14 DIAGNOSIS — G47.10 HYPERSOMNOLENCE: Primary | ICD-10-CM

## 2019-08-14 DIAGNOSIS — R53.82 CHRONIC FATIGUE: ICD-10-CM

## 2019-08-14 DIAGNOSIS — G47.9 SLEEP DISORDER: ICD-10-CM

## 2019-08-14 DIAGNOSIS — G47.10 HYPERSOMNOLENCE: ICD-10-CM

## 2019-08-14 LAB
A/G RATIO: 2.3 (ref 1.1–2.2)
ALBUMIN SERPL-MCNC: 4.8 G/DL (ref 3.4–5)
ALP BLD-CCNC: 89 U/L (ref 40–129)
ALT SERPL-CCNC: 42 U/L (ref 10–40)
ANION GAP SERPL CALCULATED.3IONS-SCNC: 18 MMOL/L (ref 3–16)
AST SERPL-CCNC: 76 U/L (ref 15–37)
BASOPHILS ABSOLUTE: 0 K/UL (ref 0–0.2)
BASOPHILS RELATIVE PERCENT: 0.6 %
BILIRUB SERPL-MCNC: <0.2 MG/DL (ref 0–1)
BUN BLDV-MCNC: 17 MG/DL (ref 7–20)
CALCIUM SERPL-MCNC: 10 MG/DL (ref 8.3–10.6)
CHLORIDE BLD-SCNC: 96 MMOL/L (ref 99–110)
CO2: 25 MMOL/L (ref 21–32)
CREAT SERPL-MCNC: 0.8 MG/DL (ref 0.9–1.3)
EOSINOPHILS ABSOLUTE: 0.2 K/UL (ref 0–0.6)
EOSINOPHILS RELATIVE PERCENT: 3 %
GFR AFRICAN AMERICAN: >60
GFR NON-AFRICAN AMERICAN: >60
GLOBULIN: 2.1 G/DL
GLUCOSE BLD-MCNC: 106 MG/DL (ref 70–99)
HCT VFR BLD CALC: 41.8 % (ref 40.5–52.5)
HEMOGLOBIN: 14.3 G/DL (ref 13.5–17.5)
LYMPHOCYTES ABSOLUTE: 2.3 K/UL (ref 1–5.1)
LYMPHOCYTES RELATIVE PERCENT: 42.8 %
MAGNESIUM: 1.9 MG/DL (ref 1.8–2.4)
MCH RBC QN AUTO: 30.4 PG (ref 26–34)
MCHC RBC AUTO-ENTMCNC: 34.2 G/DL (ref 31–36)
MCV RBC AUTO: 88.9 FL (ref 80–100)
MONOCYTES ABSOLUTE: 0.3 K/UL (ref 0–1.3)
MONOCYTES RELATIVE PERCENT: 6 %
NEUTROPHILS ABSOLUTE: 2.5 K/UL (ref 1.7–7.7)
NEUTROPHILS RELATIVE PERCENT: 47.6 %
PDW BLD-RTO: 13.1 % (ref 12.4–15.4)
PLATELET # BLD: 167 K/UL (ref 135–450)
PMV BLD AUTO: 10.8 FL (ref 5–10.5)
POTASSIUM SERPL-SCNC: 4 MMOL/L (ref 3.5–5.1)
RBC # BLD: 4.7 M/UL (ref 4.2–5.9)
RHEUMATOID FACTOR: <10 IU/ML
SEDIMENTATION RATE, ERYTHROCYTE: 8 MM/HR (ref 0–15)
SODIUM BLD-SCNC: 139 MMOL/L (ref 136–145)
T4 FREE: 1.1 NG/DL (ref 0.9–1.8)
TOTAL PROTEIN: 6.9 G/DL (ref 6.4–8.2)
TSH REFLEX FT4: 4.26 UIU/ML (ref 0.27–4.2)
VITAMIN B-12: 705 PG/ML (ref 211–911)
WBC # BLD: 5.3 K/UL (ref 4–11)

## 2019-08-14 PROCEDURE — 99214 OFFICE O/P EST MOD 30 MIN: CPT | Performed by: PHYSICIAN ASSISTANT

## 2019-08-14 ASSESSMENT — SLEEP AND FATIGUE QUESTIONNAIRES
HOW LIKELY ARE YOU TO NOD OFF OR FALL ASLEEP WHILE SITTING INACTIVE IN A PUBLIC PLACE: 0
HAVE YOU BEEN TOLD YOU SNORE, EVEN INTERMITTENTLY: 1
HAVE YOU EVER BEEN TOLD YOU STOP BREATHING OR HOLD YOUR BREATH WHILE SLEEPING: 0
DO YOU WAKE UP TIRED, ARE YOU TIRED DURING THE DAY, OR DO YOU TAKE NAPS: 1
HOW LIKELY ARE YOU TO NOD OFF OR FALL ASLEEP WHILE SITTING QUIETLY AFTER LUNCH WITHOUT ALCOHOL: 0
HOW LIKELY ARE YOU TO NOD OFF OR FALL ASLEEP WHILE LYING DOWN TO REST IN THE AFTERNOON WHEN CIRCUMSTANCES PERMIT: 3
HOW LIKELY ARE YOU TO NOD OFF OR FALL ASLEEP WHILE SITTING AND TALKING TO SOMEONE: 0
ESS TOTAL SCORE: 8
HOW LIKELY ARE YOU TO NOD OFF OR FALL ASLEEP WHILE SITTING AND READING: 2
DO YOU HAVE CONGESTIVE HEART FAILURE (CHF), HYPERTENSION, CORONARY DISEASE, CARDIAC ARRYTHMIAS, DIABETES, OR HAD A STROKE: 0
HOW LIKELY ARE YOU TO NOD OFF OR FALL ASLEEP WHEN YOU ARE A PASSENGER IN A CAR FOR AN HOUR WITHOUT A BREAK: 2
HOW LIKELY ARE YOU TO NOD OFF OR FALL ASLEEP IN A CAR, WHILE STOPPED FOR A FEW MINUTES IN TRAFFIC: 0
SLEEP APNEA SCREENER SCORE: 2
DO YOU WAKE UP MORE THAN ONCE A NIGHT TO URINATE: 0
HOW LIKELY ARE YOU TO NOD OFF OR FALL ASLEEP WHILE WATCHING TV: 1

## 2019-08-14 ASSESSMENT — ANXIETY QUESTIONNAIRES
5. BEING SO RESTLESS THAT IT IS HARD TO SIT STILL: 0-NOT AT ALL SURE
4. TROUBLE RELAXING: 1-SEVERAL DAYS
2. NOT BEING ABLE TO STOP OR CONTROL WORRYING: 0-NOT AT ALL SURE
3. WORRYING TOO MUCH ABOUT DIFFERENT THINGS: 0-NOT AT ALL SURE
1. FEELING NERVOUS, ANXIOUS, OR ON EDGE: 0-NOT AT ALL SURE
7. FEELING AFRAID AS IF SOMETHING AWFUL MIGHT HAPPEN: 0-NOT AT ALL SURE
GAD7 TOTAL SCORE: 3
6. BECOMING EASILY ANNOYED OR IRRITABLE: 2-OVER HALF THE DAYS

## 2019-08-14 ASSESSMENT — PATIENT HEALTH QUESTIONNAIRE - PHQ9
6. FEELING BAD ABOUT YOURSELF - OR THAT YOU ARE A FAILURE OR HAVE LET YOURSELF OR YOUR FAMILY DOWN: 0
SUM OF ALL RESPONSES TO PHQ QUESTIONS 1-9: 8
SUM OF ALL RESPONSES TO PHQ9 QUESTIONS 1 & 2: 1
7. TROUBLE CONCENTRATING ON THINGS, SUCH AS READING THE NEWSPAPER OR WATCHING TELEVISION: 3
4. FEELING TIRED OR HAVING LITTLE ENERGY: 2
10. IF YOU CHECKED OFF ANY PROBLEMS, HOW DIFFICULT HAVE THESE PROBLEMS MADE IT FOR YOU TO DO YOUR WORK, TAKE CARE OF THINGS AT HOME, OR GET ALONG WITH OTHER PEOPLE: 1
9. THOUGHTS THAT YOU WOULD BE BETTER OFF DEAD, OR OF HURTING YOURSELF: 0
5. POOR APPETITE OR OVEREATING: 0
2. FEELING DOWN, DEPRESSED OR HOPELESS: 0
1. LITTLE INTEREST OR PLEASURE IN DOING THINGS: 1
SUM OF ALL RESPONSES TO PHQ QUESTIONS 1-9: 8
3. TROUBLE FALLING OR STAYING ASLEEP: 1
8. MOVING OR SPEAKING SO SLOWLY THAT OTHER PEOPLE COULD HAVE NOTICED. OR THE OPPOSITE, BEING SO FIGETY OR RESTLESS THAT YOU HAVE BEEN MOVING AROUND A LOT MORE THAN USUAL: 1

## 2019-08-14 ASSESSMENT — ENCOUNTER SYMPTOMS
COUGH: 0
WHEEZING: 0
VOMITING: 0
BACK PAIN: 1
DIARRHEA: 0
SHORTNESS OF BREATH: 0
CHEST TIGHTNESS: 0
NAUSEA: 0

## 2019-08-14 NOTE — PROGRESS NOTES
shortness of breath and wheezing. Cardiovascular: Negative for chest pain and leg swelling. Gastrointestinal: Negative for diarrhea, nausea and vomiting. Endocrine: Negative for cold intolerance, heat intolerance, polydipsia and polyuria. Genitourinary: Negative for difficulty urinating. No decrease in libido   Musculoskeletal: Positive for back pain (lower back pain while at work). Skin: Negative for rash. Neurological: Positive for headaches. Negative for dizziness, tremors, facial asymmetry, weakness, light-headedness and numbness. Psychiatric/Behavioral: Positive for sleep disturbance. Negative for dysphoric mood. The patient is not nervous/anxious.         SOCIAL HISTORY  Social History     Socioeconomic History    Marital status:      Spouse name: None    Number of children: None    Years of education: None    Highest education level: None   Occupational History    None   Social Needs    Financial resource strain: None    Food insecurity:     Worry: None     Inability: None    Transportation needs:     Medical: None     Non-medical: None   Tobacco Use    Smoking status: Never Smoker    Smokeless tobacco: Never Used   Substance and Sexual Activity    Alcohol use: Yes     Comment: OCCASIONAL    Drug use: No    Sexual activity: None   Lifestyle    Physical activity:     Days per week: None     Minutes per session: None    Stress: None   Relationships    Social connections:     Talks on phone: None     Gets together: None     Attends Gnosticist service: None     Active member of club or organization: None     Attends meetings of clubs or organizations: None     Relationship status: None    Intimate partner violence:     Fear of current or ex partner: None     Emotionally abused: None     Physically abused: None     Forced sexual activity: None   Other Topics Concern    None   Social History Narrative    None        CURRENT MEDICATIONS  Current Outpatient Medications

## 2019-08-15 LAB
ANTI-NUCLEAR ANTIBODY (ANA): NEGATIVE
ESTIMATED AVERAGE GLUCOSE: 108.3 MG/DL
HBA1C MFR BLD: 5.4 %
HIV AG/AB: NORMAL
HIV ANTIGEN: NORMAL
HIV-1 ANTIBODY: NORMAL
HIV-2 AB: NORMAL

## 2019-08-16 ENCOUNTER — TELEPHONE (OUTPATIENT)
Dept: FAMILY MEDICINE CLINIC | Age: 39
End: 2019-08-16

## 2019-08-16 DIAGNOSIS — R79.89 ABNORMAL LFTS: Primary | ICD-10-CM

## 2019-08-16 LAB — TESTOSTERONE TOTAL: 279 NG/DL (ref 220–1000)

## 2019-08-16 NOTE — TELEPHONE ENCOUNTER
SPOKE WITH PATIENT AND HE REPORTED THAT HE SCHEDULED AN APPT WITH [facility] FOR 9/9/19. HIS APPT ON 9/4/19 IS FOR A CONSULTATION AT THE [facility].

## 2019-08-16 NOTE — TELEPHONE ENCOUNTER
SPOKE WITH SAIMA CONCERNING PAPERWORK AND SHE SAID THAT THE TENTATIVE DATE FOR PATIENT TO RETURN TO WORK IS 9/11/19. HE HAS A SLEEP STUDY SCHEDULED FOR 9/4/19. WILL NEED TO F/U WITH HER BEFORE 9/11/19 TO DETERMINE IF/WHEN HE CAN RETURN TO WORK AND IF HE WILL HAVE ANY RESTRICTIONS. LEFT MESSAGE PATIENT'S VM INFORMING HIM.

## 2019-08-16 NOTE — TELEPHONE ENCOUNTER
----- Message from Homar Koroma PA-C sent at 8/15/2019  5:18 PM EDT -----  Labs overall look good except for increase in liver enzymes. This could be from the patient's Depakote; would like patient to avoid all NSAIDs, Tylenol and alcohol and repeat liver functions in 1 month, if still elevated may need to adjust dose of Depakote and consultation with his neurologist.  Alanlogan Almendarez hormone is just minimally off, would recommend iodine supplement over-the-counter, this can be in combination with a multivitamin. Labs for autoimmune diseases is negative. B 12 is normal.  Testosterone result not back yet, this lab usually takes a couple of days.

## 2019-08-16 NOTE — TELEPHONE ENCOUNTER
PATIENT INFORMED OF LAB RESULTS PER [facility]'S NOTE. PATIENT VOICED UNDERSTANDING. ORDER FOR LIVER FX TEST ENTERED FOR DRAW IN 1 MONTH. PATIENT ALSO QUESTIONED HIS PAPERWORK FOR WORK. REPORTS THAT HE WILL NEED RETURN TO WORK WITH/WITHOUT RESTRICTIONS FILLED OUT TO GO BACK.

## 2019-08-19 ENCOUNTER — TELEPHONE (OUTPATIENT)
Dept: FAMILY MEDICINE CLINIC | Age: 39
End: 2019-08-19

## 2019-09-04 ENCOUNTER — HOSPITAL ENCOUNTER (OUTPATIENT)
Dept: SLEEP CENTER | Age: 39
Discharge: HOME | End: 2019-09-04
Payer: COMMERCIAL

## 2019-09-04 VITALS
SYSTOLIC BLOOD PRESSURE: 141 MMHG | HEART RATE: 75 BPM | OXYGEN SATURATION: 97 % | BODY MASS INDEX: 32 KG/M2 | WEIGHT: 199.1 LBS | HEIGHT: 66 IN | DIASTOLIC BLOOD PRESSURE: 89 MMHG

## 2019-09-04 DIAGNOSIS — G47.33 OSA (OBSTRUCTIVE SLEEP APNEA): Primary | ICD-10-CM

## 2019-09-04 PROCEDURE — 99211 OFF/OP EST MAY X REQ PHY/QHP: CPT | Performed by: PSYCHIATRY & NEUROLOGY

## 2019-09-04 ASSESSMENT — SLEEP AND FATIGUE QUESTIONNAIRES
HOW LIKELY ARE YOU TO NOD OFF OR FALL ASLEEP WHILE SITTING AND TALKING TO SOMEONE: 2
HOW LIKELY ARE YOU TO NOD OFF OR FALL ASLEEP WHILE SITTING INACTIVE IN A PUBLIC PLACE: 1
HOW LIKELY ARE YOU TO NOD OFF OR FALL ASLEEP WHEN YOU ARE A PASSENGER IN A CAR FOR AN HOUR WITHOUT A BREAK: 3
HOW LIKELY ARE YOU TO NOD OFF OR FALL ASLEEP WHILE SITTING QUIETLY AFTER LUNCH WITHOUT ALCOHOL: 2
HOW LIKELY ARE YOU TO NOD OFF OR FALL ASLEEP IN A CAR, WHILE STOPPED FOR A FEW MINUTES IN TRAFFIC: 1
HOW LIKELY ARE YOU TO NOD OFF OR FALL ASLEEP WHILE SITTING AND READING: 3
HOW LIKELY ARE YOU TO NOD OFF OR FALL ASLEEP WHILE WATCHING TV: 2
HOW LIKELY ARE YOU TO NOD OFF OR FALL ASLEEP WHILE LYING DOWN TO REST IN THE AFTERNOON WHEN CIRCUMSTANCES PERMIT: 2
ESS TOTAL SCORE: 16

## 2019-09-04 NOTE — PROGRESS NOTES
Patti Rodriges MD, Danis Arreguin MD, Rusty Weiner MD, Twanda Phoenix MD, [facility], [place]      [address], [address] [facility]   PH: [phone]  F: [phone]     Subjective:     Patient ID: Chayo Maria is a 44 y.o. male, referred to the sleep center for   Chief Complaint   Patient presents with    Daytime Sleepiness    Fatigue   . Referring physician:   Dr Marcaino Larkin     History:    EDS for 1 yr. Stays tired most of the day. Sleeps 6-7 hrs; wakes up 1-2 / night. Some snoring. not much restlessness legs at sleep time. ;   apneas haves not been observed. Medical W/U like TSH, metabolic profile  Etc have been reported to be fair.       Social History     Socioeconomic History    Marital status:      Spouse name: Not on file    Number of children: Not on file    Years of education: Not on file    Highest education level: Not on file   Occupational History    Not on file   Social Needs    Financial resource strain: Not on file    Food insecurity:     Worry: Not on file     Inability: Not on file    Transportation needs:     Medical: Not on file     Non-medical: Not on file   Tobacco Use    Smoking status: Never Smoker    Smokeless tobacco: Never Used   Substance and Sexual Activity    Alcohol use: Yes     Comment: OCCASIONAL    Drug use: No    Sexual activity: Not on file   Lifestyle    Physical activity:     Days per week: Not on file     Minutes per session: Not on file    Stress: Not on file   Relationships    Social connections:     Talks on phone: Not on file     Gets together: Not on file     Attends Buddhist service: Not on file     Active member of club or organization: Not on file     Attends meetings of clubs or organizations: Not on file     Relationship status: Not on file    Intimate partner violence:     Fear of current or ex partner: Not on file     Emotionally abused: accessory muscle use. No crackles. No wheezes. No rhonchi. No dullness on percussion. CV: Regular rate. Regular rhythm. No murmur or rub. No edema. GI: Non-tender. Non-distended. No hernia. Skin: Warm, dry, normal texture and turgor. No nodule on exposed extremities. Lymph: No cervical LAD. No supraclavicular LAD. M/S: No cyanosis. No clubbing. No joint deformity. Psych: Oriented x 3. No anxiety. Awake. Alert. Intact judgement and insight.     Mallampati Airway Classification:   []1 [x]2 []3 []4        Sleep Complaints/Symptoms:    Normal Bedtime:      Normal Wake Time:   Average Sleep Time:  6 -7 hrs    Number of Awakenings:  1-2 / night    Duration of Sleep Complaints: 1 years    [x] Snoring     [] Improved [] Not Improved    [] Choking/Gasping for Breath  [] Improved [] Not Improved       [] Witnessed Apneas              [] Improved [] Not Improved  [x] Daytime Sleepiness             [] Improved [] Not Improved  [x] Morning Headaches    [] Improved [] Not Improved  [] Frequent Awakenings       [] Improved [] Not Improved  [] Jerky Movements   [] Improved [] Not Improved   [] Restless Legs   [] Improved [] Not Improved   [] Difficulty Initiating Sleep  [] Improved [] Not Improved   [] Difficulty Maintaining Sleep  [] Improved [] Not Improved   [] Restless Sleep    [] Improved [] Not Improved   [] Sleep Paralysis    [] Improved [] Not Improved   [] Muscle Weakness w/ Emotion  [] Improved [] Not Improved  [] Other :     CPAP Usage:    [x]  Patient has never worn CPAP  []  Patient has worn CPAP previously but discontinued use  []  Current PAP user,  [years]   []  Patient Tolerates Well   []  Patient Does Not Tolerate     []  Patient Uses CPAP      []  More Than 4 Hours      []  Less Than 4 Hours  []  CPAP/BPAP/ASV Pressure Readings   []  CPAP Pressure      cm H20   []  BPAP Pressure       cm H20   []  ASV Pressure         cm H20      Assessment:      Diagnosis:        SANTANA  Hypersomnia      Patient Active

## 2019-09-09 ENCOUNTER — OFFICE VISIT (OUTPATIENT)
Dept: FAMILY MEDICINE CLINIC | Age: 39
End: 2019-09-09
Payer: COMMERCIAL

## 2019-09-09 VITALS
WEIGHT: 193.4 LBS | OXYGEN SATURATION: 96 % | SYSTOLIC BLOOD PRESSURE: 126 MMHG | HEIGHT: 66 IN | BODY MASS INDEX: 31.08 KG/M2 | DIASTOLIC BLOOD PRESSURE: 76 MMHG | HEART RATE: 72 BPM

## 2019-09-09 DIAGNOSIS — R53.82 CHRONIC FATIGUE: ICD-10-CM

## 2019-09-09 DIAGNOSIS — G47.10 HYPERSOMNOLENCE: Primary | ICD-10-CM

## 2019-09-09 DIAGNOSIS — R79.89 ABNORMAL TSH: ICD-10-CM

## 2019-09-09 DIAGNOSIS — R79.89 ABNORMAL LFTS: ICD-10-CM

## 2019-09-09 LAB
ALBUMIN SERPL-MCNC: 5.3 G/DL (ref 3.4–5)
ALP BLD-CCNC: 85 U/L (ref 40–129)
ALT SERPL-CCNC: 17 U/L (ref 10–40)
AST SERPL-CCNC: 42 U/L (ref 15–37)
BILIRUB SERPL-MCNC: <0.2 MG/DL (ref 0–1)
BILIRUBIN DIRECT: <0.2 MG/DL (ref 0–0.3)
BILIRUBIN, INDIRECT: ABNORMAL MG/DL (ref 0–1)
TOTAL PROTEIN: 7.4 G/DL (ref 6.4–8.2)
TSH SERPL DL<=0.05 MIU/L-ACNC: 6.78 UIU/ML (ref 0.27–4.2)

## 2019-09-09 PROCEDURE — 1036F TOBACCO NON-USER: CPT | Performed by: PHYSICIAN ASSISTANT

## 2019-09-09 PROCEDURE — 99213 OFFICE O/P EST LOW 20 MIN: CPT | Performed by: PHYSICIAN ASSISTANT

## 2019-09-09 PROCEDURE — G8417 CALC BMI ABV UP PARAM F/U: HCPCS | Performed by: PHYSICIAN ASSISTANT

## 2019-09-09 PROCEDURE — G8427 DOCREV CUR MEDS BY ELIG CLIN: HCPCS | Performed by: PHYSICIAN ASSISTANT

## 2019-09-09 RX ORDER — IBUPROFEN 800 MG/1
800 TABLET ORAL DAILY PRN
Qty: 30 TABLET | Refills: 2 | Status: CANCELLED | OUTPATIENT
Start: 2019-09-09

## 2019-09-09 NOTE — PROGRESS NOTES
9/9/2019    Amy Del Angel    Chief Complaint   Patient presents with    Other     F/U ON SLEEP STUDY. SCHEDULED 9/20/19 AND PT DID HAVE A CONSULT WITH THEM.  Other     [employer] PAPERWORK TO BE FILLED OUT TO GO BACK TO WORK 9/11/19.  Medication Refill     TO [facility]       HPI  History was obtained from the patient. eTresa Mercado is a 44 y.o. male who presents today for follow-up. Patient did see Dr. Cheyenne Ornelas at the sleep center and has a sleep study set up for 9/20/2019. He overall is feeling much better and has not had any more episodes of the extreme fatigue since his last visit here. He does feel able to return to work on 9/11/2019. He did start the iodine supplement as instructed and has been avoiding NSAIDs. Is due to recheck his thyroid and liver enzymes today. He has started working out and eating healthier. REVIEW OF SYMPTOMS    Review of Systems   Constitutional: Negative for fatigue. Neurological: Negative for tremors, seizures and syncope. Psychiatric/Behavioral: Positive for sleep disturbance (? being worked up).        SOCIAL HISTORY  Social History     Socioeconomic History    Marital status:      Spouse name: None    Number of children: None    Years of education: None    Highest education level: None   Occupational History    None   Social Needs    Financial resource strain: None    Food insecurity:     Worry: None     Inability: None    Transportation needs:     Medical: None     Non-medical: None   Tobacco Use    Smoking status: Never Smoker    Smokeless tobacco: Never Used   Substance and Sexual Activity    Alcohol use: Yes     Comment: OCCASIONAL    Drug use: No    Sexual activity: None   Lifestyle    Physical activity:     Days per week: None     Minutes per session: None    Stress: None   Relationships    Social connections:     Talks on phone: None     Gets together: None     Attends [employer] service: None     Active member of club or organization: None Attends meetings of clubs or organizations: None     Relationship status: None    Intimate partner violence:     Fear of current or ex partner: None     Emotionally abused: None     Physically abused: None     Forced sexual activity: None   Other Topics Concern    None   Social History Narrative    None        CURRENT MEDICATIONS  Current Outpatient Medications   Medication Sig Dispense Refill    divalproex (DEPAKOTE ER) 500 MG extended release tablet Take 500 mg by mouth nightly Indications: 3 tabs by mouth at bedtime      ibuprofen (ADVIL;MOTRIN) 800 MG tablet Take 800 mg by mouth daily as needed for Pain TAKES PRN      allopurinol (ZYLOPRIM) 300 MG tablet TAKE 1 TABLET BY MOUTH DAILY ROUTINE OFFICE VISIT FOR FURTHER REFILLS. 90 tablet 0     No current facility-administered medications for this visit. PHYSICAL EXAM    /76 (Site: Left Upper Arm, Position: Sitting, Cuff Size: Large Adult)   Pulse 72   Ht 5' 6\" (1.676 m)   Wt 193 lb 6.4 oz (87.7 kg)   SpO2 96% Comment: RA  BMI 31.22 kg/m²     Physical Exam   Constitutional: He is oriented to person, place, and time. He appears well-developed and well-nourished. HENT:   Head: Normocephalic and atraumatic. Cardiovascular: Normal rate, regular rhythm and normal heart sounds. Pulmonary/Chest: Effort normal and breath sounds normal.   Neurological: He is alert and oriented to person, place, and time. Psychiatric: He has a normal mood and affect. His behavior is normal.   Nursing note and vitals reviewed. ASSESSMENT & PLAN    1. Hypersomnolence  2. Chronic fatigue  Follow-up for sleep study    3. Abnormal TSH  Reevaluate thyroid level on lab work  - TSH without Reflex; Future  - T4, Free; Future    4.  Abnormal LFTs  Reevaluate liver function tests, order already in system    Return to work paperwork completed for 9/11/2019       Electronically signed by Clark Collins PA-C on 9/9/2019    Please note that this chart was generated

## 2019-09-10 ENCOUNTER — TELEPHONE (OUTPATIENT)
Dept: FAMILY MEDICINE CLINIC | Age: 39
End: 2019-09-10

## 2019-09-10 LAB — T4 FREE: 1.1 NG/DL (ref 0.9–1.8)

## 2019-09-10 RX ORDER — LEVOTHYROXINE SODIUM 0.03 MG/1
25 TABLET ORAL DAILY
Qty: 30 TABLET | Refills: 1 | Status: SHIPPED | OUTPATIENT
Start: 2019-09-10 | End: 2019-10-02 | Stop reason: SDUPTHER

## 2019-09-10 NOTE — TELEPHONE ENCOUNTER
[facility]) LEVOTHYROXINE 25MCG 1 QD #30/1.     Electronically signed by Shelley Griffiths MA on 9/10/2019 at 11:24 AM

## 2019-09-19 RX ORDER — ALLOPURINOL 300 MG/1
TABLET ORAL
Qty: 30 TABLET | Refills: 0 | Status: SHIPPED | OUTPATIENT
Start: 2019-09-19 | End: 2019-10-02 | Stop reason: SDUPTHER

## 2019-10-02 RX ORDER — LEVOTHYROXINE SODIUM 0.03 MG/1
TABLET ORAL
Qty: 30 TABLET | Refills: 1 | Status: SHIPPED | OUTPATIENT
Start: 2019-10-02 | End: 2019-10-26 | Stop reason: SDUPTHER

## 2019-10-02 RX ORDER — ALLOPURINOL 300 MG/1
TABLET ORAL
Qty: 30 TABLET | Refills: 3 | Status: SHIPPED | OUTPATIENT
Start: 2019-10-02 | End: 2020-01-27

## 2019-10-10 ENCOUNTER — HOSPITAL ENCOUNTER (OUTPATIENT)
Dept: SLEEP CENTER | Age: 39
Discharge: HOME | End: 2019-10-10
Payer: COMMERCIAL

## 2019-10-10 PROCEDURE — G0398 HOME SLEEP TEST/TYPE 2 PORTA: HCPCS

## 2019-10-23 ENCOUNTER — HOSPITAL ENCOUNTER (OUTPATIENT)
Dept: SLEEP CENTER | Age: 39
Discharge: HOME | End: 2019-10-23
Payer: COMMERCIAL

## 2019-10-23 PROCEDURE — 9990000010 HC NO CHARGE VISIT: Performed by: PSYCHIATRY & NEUROLOGY

## 2019-10-26 ENCOUNTER — TELEPHONE (OUTPATIENT)
Dept: FAMILY MEDICINE CLINIC | Age: 39
End: 2019-10-26

## 2019-10-29 RX ORDER — LEVOTHYROXINE SODIUM 0.03 MG/1
TABLET ORAL
Qty: 30 TABLET | Refills: 0 | Status: SHIPPED | OUTPATIENT
Start: 2019-10-29 | End: 2019-11-24 | Stop reason: SDUPTHER

## 2019-11-25 RX ORDER — LEVOTHYROXINE SODIUM 0.03 MG/1
TABLET ORAL
Qty: 30 TABLET | Refills: 1 | Status: SHIPPED | OUTPATIENT
Start: 2019-11-25 | End: 2020-01-07

## 2019-12-13 ENCOUNTER — OFFICE VISIT (OUTPATIENT)
Dept: FAMILY MEDICINE CLINIC | Age: 39
End: 2019-12-13
Payer: COMMERCIAL

## 2019-12-13 VITALS
HEIGHT: 66 IN | SYSTOLIC BLOOD PRESSURE: 122 MMHG | OXYGEN SATURATION: 97 % | DIASTOLIC BLOOD PRESSURE: 89 MMHG | HEART RATE: 71 BPM | BODY MASS INDEX: 32.69 KG/M2 | WEIGHT: 203.4 LBS

## 2019-12-13 DIAGNOSIS — R79.89 ABNORMAL TSH: ICD-10-CM

## 2019-12-13 DIAGNOSIS — R79.89 ABNORMAL LFTS: ICD-10-CM

## 2019-12-13 DIAGNOSIS — R79.89 ABNORMAL TSH: Primary | ICD-10-CM

## 2019-12-13 LAB
ALBUMIN SERPL-MCNC: 4.6 G/DL (ref 3.4–5)
ALP BLD-CCNC: 87 U/L (ref 40–129)
ALT SERPL-CCNC: 20 U/L (ref 10–40)
AST SERPL-CCNC: 27 U/L (ref 15–37)
BILIRUB SERPL-MCNC: 0.3 MG/DL (ref 0–1)
BILIRUBIN DIRECT: <0.2 MG/DL (ref 0–0.3)
BILIRUBIN, INDIRECT: NORMAL MG/DL (ref 0–1)
T4 FREE: 1 NG/DL (ref 0.9–1.8)
TOTAL PROTEIN: 6.9 G/DL (ref 6.4–8.2)
TSH SERPL DL<=0.05 MIU/L-ACNC: 3.62 UIU/ML (ref 0.27–4.2)

## 2019-12-13 PROCEDURE — G8417 CALC BMI ABV UP PARAM F/U: HCPCS | Performed by: PHYSICIAN ASSISTANT

## 2019-12-13 PROCEDURE — 99213 OFFICE O/P EST LOW 20 MIN: CPT | Performed by: PHYSICIAN ASSISTANT

## 2019-12-13 PROCEDURE — G8427 DOCREV CUR MEDS BY ELIG CLIN: HCPCS | Performed by: PHYSICIAN ASSISTANT

## 2019-12-13 PROCEDURE — G8484 FLU IMMUNIZE NO ADMIN: HCPCS | Performed by: PHYSICIAN ASSISTANT

## 2019-12-13 PROCEDURE — 1036F TOBACCO NON-USER: CPT | Performed by: PHYSICIAN ASSISTANT

## 2019-12-13 RX ORDER — LEVOTHYROXINE SODIUM 0.03 MG/1
TABLET ORAL
Qty: 30 TABLET | Refills: 5 | Status: CANCELLED | OUTPATIENT
Start: 2019-12-13

## 2020-02-11 RX ORDER — LEVOTHYROXINE SODIUM 0.03 MG/1
TABLET ORAL
Qty: 90 TABLET | Refills: 0 | Status: SHIPPED | OUTPATIENT
Start: 2020-02-11 | End: 2020-05-04

## 2020-02-11 RX ORDER — LEVOTHYROXINE SODIUM 0.03 MG/1
TABLET ORAL
Qty: 30 TABLET | Refills: 1 | Status: SHIPPED | OUTPATIENT
Start: 2020-02-11 | End: 2020-02-11 | Stop reason: SDUPTHER

## 2020-03-17 ENCOUNTER — TELEPHONE (OUTPATIENT)
Dept: FAMILY MEDICINE CLINIC | Age: 40
End: 2020-03-17

## 2020-03-17 NOTE — TELEPHONE ENCOUNTER
03/17/2020 Left message for Jasmin Maria to call and reschedule 03/13/2020 missed appointment.   [facility] AT [place]

## 2020-04-08 RX ORDER — IBUPROFEN 800 MG/1
TABLET ORAL
Qty: 90 TABLET | Refills: 1 | OUTPATIENT
Start: 2020-04-08

## 2020-04-21 RX ORDER — ALLOPURINOL 300 MG/1
TABLET ORAL
Qty: 30 TABLET | Refills: 0 | Status: SHIPPED | OUTPATIENT
Start: 2020-04-21 | End: 2020-05-08 | Stop reason: SDUPTHER

## 2020-05-04 RX ORDER — LEVOTHYROXINE SODIUM 0.03 MG/1
TABLET ORAL
Qty: 90 TABLET | Refills: 0 | Status: SHIPPED | OUTPATIENT
Start: 2020-05-04 | End: 2020-05-08 | Stop reason: SDUPTHER

## 2020-05-08 ENCOUNTER — OFFICE VISIT (OUTPATIENT)
Dept: FAMILY MEDICINE CLINIC | Age: 40
End: 2020-05-08
Payer: COMMERCIAL

## 2020-05-08 VITALS
WEIGHT: 203.5 LBS | SYSTOLIC BLOOD PRESSURE: 128 MMHG | OXYGEN SATURATION: 97 % | HEART RATE: 68 BPM | HEIGHT: 66 IN | DIASTOLIC BLOOD PRESSURE: 84 MMHG | BODY MASS INDEX: 32.71 KG/M2

## 2020-05-08 PROBLEM — E03.8 OTHER SPECIFIED HYPOTHYROIDISM: Chronic | Status: ACTIVE | Noted: 2020-05-08

## 2020-05-08 PROCEDURE — 1036F TOBACCO NON-USER: CPT | Performed by: FAMILY MEDICINE

## 2020-05-08 PROCEDURE — G8427 DOCREV CUR MEDS BY ELIG CLIN: HCPCS | Performed by: FAMILY MEDICINE

## 2020-05-08 PROCEDURE — 99213 OFFICE O/P EST LOW 20 MIN: CPT | Performed by: FAMILY MEDICINE

## 2020-05-08 PROCEDURE — G8417 CALC BMI ABV UP PARAM F/U: HCPCS | Performed by: FAMILY MEDICINE

## 2020-05-08 RX ORDER — LEVOTHYROXINE SODIUM 0.03 MG/1
25 TABLET ORAL DAILY
Qty: 90 TABLET | Refills: 1 | Status: SHIPPED | OUTPATIENT
Start: 2020-05-08 | End: 2020-12-11 | Stop reason: SDUPTHER

## 2020-05-08 RX ORDER — ALLOPURINOL 300 MG/1
300 TABLET ORAL DAILY
Qty: 90 TABLET | Refills: 1 | Status: SHIPPED | OUTPATIENT
Start: 2020-05-08 | End: 2020-10-06

## 2020-05-08 RX ORDER — DIVALPROEX SODIUM 500 MG/1
500 TABLET, EXTENDED RELEASE ORAL NIGHTLY
Qty: 90 TABLET | Refills: 1 | Status: SHIPPED | OUTPATIENT
Start: 2020-05-08 | End: 2020-12-11 | Stop reason: SDUPTHER

## 2020-05-08 ASSESSMENT — PATIENT HEALTH QUESTIONNAIRE - PHQ9
SUM OF ALL RESPONSES TO PHQ QUESTIONS 1-9: 0
1. LITTLE INTEREST OR PLEASURE IN DOING THINGS: 0
SUM OF ALL RESPONSES TO PHQ QUESTIONS 1-9: 0
SUM OF ALL RESPONSES TO PHQ9 QUESTIONS 1 & 2: 0
2. FEELING DOWN, DEPRESSED OR HOPELESS: 0

## 2020-05-08 NOTE — PROGRESS NOTES
2020     Geoffrey Hancock      Chief Complaint   Patient presents with    6 Month Follow-Up     non fasting, no questions or concerns per pt, needs med refills per pt sent to [facility] main       HPI      Biju Muñoz is a 36 y.o. male who presents today with the followin. Seizure ([address]    2. Gout, unspecified cause, unspecified chronicity, unspecified site    3. Other specified hypothyroidism    4. History of colonic polyps    Is here for follow-up of several things  He needed his medicines renewed  He goes to a neurologist in [place] is on Depakote for seizures and has not had a seizure in over a year  A question about his privileges to drive and he said that the neurologist okayed this    REVIEW OF SYMPTOMS    Review of Systems   Constitutional: Negative for activity change and unexpected weight change. Gastrointestinal:        History of adenomatous colon polyps  He is overdue for his 5-year follow-up and wanted me to make a referral  He had some minimal transaminase elevations previously but on last lab panel these have resolved to normal  There is nothing to indicate chronic or acute liver disease   Endocrine:        Longstanding hypothyroidism on replacement therapy  His last thyroid labs were reviewed and they were in the normal range   Musculoskeletal:        Has a history of gout but he is on allopurinol and he has been under control for over a year   Neurological: Positive for seizures. Long history of seizures which appear to be well controlled on Depakote  He is under the care of a neurologist in [place]  He tolerates the Depakote without any side effects   Psychiatric/Behavioral: Negative.         PAST MEDICAL HISTORY  Past Medical History:   Diagnosis Date    Epilepsia ([address]     Gout     Seizure disorder ([address]        FAMILY HISTORY  Family History   Problem Relation Age of Onset    Arthritis Mother     Lupus Mother     Diabetes Father     Coronary Art Dis Father     Colon Cancer Paternal Uncle SOCIAL HISTORY  Social History     Socioeconomic History    Marital status:      Spouse name: None    Number of children: None    Years of education: None    Highest education level: None   Occupational History    None   Social Needs    Financial resource strain: None    Food insecurity     Worry: None     Inability: None    Transportation needs     Medical: None     Non-medical: None   Tobacco Use    Smoking status: Never Smoker    Smokeless tobacco: Never Used   Substance and Sexual Activity    Alcohol use: Yes     Comment: OCCASIONAL    Drug use: No    Sexual activity: None   Lifestyle    Physical activity     Days per week: None     Minutes per session: None    Stress: None   Relationships    Social connections     Talks on phone: None     Gets together: None     Attends Jainism service: None     Active member of club or organization: None     Attends meetings of clubs or organizations: None     Relationship status: None    Intimate partner violence     Fear of current or ex partner: None     Emotionally abused: None     Physically abused: None     Forced sexual activity: None   Other Topics Concern    None   Social History Narrative    None        SURGICAL HISTORY  Past Surgical History:   Procedure Laterality Date    APPENDECTOMY      COLONOSCOPY  2010    Polyp    COLONOSCOPY  11/2/15    colon polyp, diverticulosis, internal hemorrhoids       CURRENT MEDICATIONS  Current Outpatient Medications   Medication Sig Dispense Refill    levothyroxine (SYNTHROID) 25 MCG tablet Take 1 tablet by mouth Daily 90 tablet 1    allopurinol (ZYLOPRIM) 300 MG tablet Take 1 tablet by mouth daily 90 tablet 1    divalproex (DEPAKOTE ER) 500 MG extended release tablet Take 1 tablet by mouth nightly Indications: 3 tabs by mouth at bedtime 90 tablet 1    ibuprofen (ADVIL;MOTRIN) 800 MG tablet Take 1 tablet by mouth daily as needed for Pain TAKES  tablet 0     No current

## 2020-05-14 ENCOUNTER — PREP FOR PROCEDURE (OUTPATIENT)
Dept: GASTROENTEROLOGY | Age: 40
End: 2020-05-14

## 2020-05-14 ENCOUNTER — TELEMEDICINE (OUTPATIENT)
Dept: GASTROENTEROLOGY | Age: 40
End: 2020-05-14
Payer: COMMERCIAL

## 2020-05-14 PROCEDURE — G8417 CALC BMI ABV UP PARAM F/U: HCPCS | Performed by: NURSE PRACTITIONER

## 2020-05-14 PROCEDURE — 99203 OFFICE O/P NEW LOW 30 MIN: CPT | Performed by: NURSE PRACTITIONER

## 2020-05-14 PROCEDURE — G8427 DOCREV CUR MEDS BY ELIG CLIN: HCPCS | Performed by: NURSE PRACTITIONER

## 2020-05-14 PROCEDURE — 1036F TOBACCO NON-USER: CPT | Performed by: NURSE PRACTITIONER

## 2020-05-14 RX ORDER — POLYETHYLENE GLYCOL 3350 17 G/17G
238 POWDER, FOR SOLUTION ORAL ONCE
Qty: 1 BOTTLE | Refills: 0 | Status: SHIPPED | OUTPATIENT
Start: 2020-05-14 | End: 2020-05-14

## 2020-05-14 RX ORDER — SODIUM CHLORIDE, SODIUM LACTATE, POTASSIUM CHLORIDE, CALCIUM CHLORIDE 600; 310; 30; 20 MG/100ML; MG/100ML; MG/100ML; MG/100ML
INJECTION, SOLUTION INTRAVENOUS CONTINUOUS
Status: CANCELLED | OUTPATIENT
Start: 2020-05-14

## 2020-05-14 RX ORDER — SODIUM CHLORIDE 0.9 % (FLUSH) 0.9 %
10 SYRINGE (ML) INJECTION PRN
Status: CANCELLED | OUTPATIENT
Start: 2020-05-14

## 2020-05-14 RX ORDER — SODIUM CHLORIDE 0.9 % (FLUSH) 0.9 %
10 SYRINGE (ML) INJECTION EVERY 12 HOURS SCHEDULED
Status: CANCELLED | OUTPATIENT
Start: 2020-05-14

## 2020-05-14 RX ORDER — BISACODYL 5 MG
TABLET, DELAYED RELEASE (ENTERIC COATED) ORAL
Qty: 4 TABLET | Refills: 0 | Status: SHIPPED | OUTPATIENT
Start: 2020-05-14 | End: 2020-12-11 | Stop reason: ALTCHOICE

## 2020-05-21 ENCOUNTER — TELEPHONE (OUTPATIENT)
Dept: SURGERY | Age: 40
End: 2020-05-21

## 2020-05-21 ENCOUNTER — TELEPHONE (OUTPATIENT)
Dept: GASTROENTEROLOGY | Age: 40
End: 2020-05-21

## 2020-05-27 ENCOUNTER — PATIENT MESSAGE (OUTPATIENT)
Dept: GASTROENTEROLOGY | Age: 40
End: 2020-05-27

## 2020-06-05 ENCOUNTER — TELEPHONE (OUTPATIENT)
Dept: SURGERY | Age: 40
End: 2020-06-05

## 2020-06-05 NOTE — TELEPHONE ENCOUNTER
From: Lona Bush  To: EVANGELIST Gaspar - CNP  Sent: 5/27/2020 9:32 AM EDT  Subject: Non-Urgent Medical Question    I'm all set to reschedule my appointment.  If you need to call me [phone] thanks

## 2020-07-06 ENCOUNTER — HOSPITAL ENCOUNTER (OUTPATIENT)
Age: 40
Discharge: HOME | End: 2020-07-06
Payer: COMMERCIAL

## 2020-07-06 PROCEDURE — U0002 COVID-19 LAB TEST NON-CDC: HCPCS

## 2020-07-08 LAB
SARS-COV-2: NOT DETECTED
SOURCE: NORMAL

## 2020-07-08 NOTE — RESULT ENCOUNTER NOTE
Good news! Your coronavirus-19 test was negative. Please continue to self quarantine as instructed until your procedure. If you have questions, please contact the surgery office at [phone].

## 2020-07-09 ENCOUNTER — ANESTHESIA EVENT (OUTPATIENT)
Dept: ENDOSCOPY | Age: 40
End: 2020-07-09
Payer: COMMERCIAL

## 2020-07-10 ENCOUNTER — HOSPITAL ENCOUNTER (OUTPATIENT)
Age: 40
Setting detail: OUTPATIENT SURGERY
Discharge: HOME | End: 2020-07-10
Attending: SURGERY | Admitting: SURGERY
Payer: COMMERCIAL

## 2020-07-10 ENCOUNTER — ANESTHESIA (OUTPATIENT)
Dept: ENDOSCOPY | Age: 40
End: 2020-07-10
Payer: COMMERCIAL

## 2020-07-10 VITALS
HEIGHT: 66 IN | WEIGHT: 190 LBS | HEART RATE: 71 BPM | DIASTOLIC BLOOD PRESSURE: 79 MMHG | RESPIRATION RATE: 16 BRPM | SYSTOLIC BLOOD PRESSURE: 140 MMHG | TEMPERATURE: 98.6 F | OXYGEN SATURATION: 98 % | BODY MASS INDEX: 30.53 KG/M2

## 2020-07-10 VITALS — OXYGEN SATURATION: 99 % | DIASTOLIC BLOOD PRESSURE: 81 MMHG | SYSTOLIC BLOOD PRESSURE: 129 MMHG

## 2020-07-10 PROCEDURE — 6360000002 HC RX W HCPCS: Performed by: NURSE ANESTHETIST, CERTIFIED REGISTERED

## 2020-07-10 PROCEDURE — 2500000003 HC RX 250 WO HCPCS: Performed by: NURSE ANESTHETIST, CERTIFIED REGISTERED

## 2020-07-10 PROCEDURE — 2709999900 HC NON-CHARGEABLE SUPPLY: Performed by: SURGERY

## 2020-07-10 PROCEDURE — 3700000001 HC ADD 15 MINUTES (ANESTHESIA): Performed by: SURGERY

## 2020-07-10 PROCEDURE — 45380 COLONOSCOPY AND BIOPSY: CPT | Performed by: SURGERY

## 2020-07-10 PROCEDURE — 3700000000 HC ANESTHESIA ATTENDED CARE: Performed by: SURGERY

## 2020-07-10 PROCEDURE — 7100000011 HC PHASE II RECOVERY - ADDTL 15 MIN: Performed by: SURGERY

## 2020-07-10 PROCEDURE — 88305 TISSUE EXAM BY PATHOLOGIST: CPT

## 2020-07-10 PROCEDURE — 7100000010 HC PHASE II RECOVERY - FIRST 15 MIN: Performed by: SURGERY

## 2020-07-10 PROCEDURE — 3609010600 HC COLONOSCOPY POLYPECTOMY SNARE/COLD BIOPSY: Performed by: SURGERY

## 2020-07-10 PROCEDURE — 2580000003 HC RX 258: Performed by: NURSE PRACTITIONER

## 2020-07-10 PROCEDURE — 6360000002 HC RX W HCPCS

## 2020-07-10 RX ORDER — LIDOCAINE HYDROCHLORIDE 20 MG/ML
INJECTION, SOLUTION EPIDURAL; INFILTRATION; INTRACAUDAL; PERINEURAL PRN
Status: DISCONTINUED | OUTPATIENT
Start: 2020-07-10 | End: 2020-07-10 | Stop reason: SDUPTHER

## 2020-07-10 RX ORDER — SODIUM CHLORIDE 0.9 % (FLUSH) 0.9 %
10 SYRINGE (ML) INJECTION EVERY 12 HOURS SCHEDULED
Status: DISCONTINUED | OUTPATIENT
Start: 2020-07-10 | End: 2020-07-10 | Stop reason: HOSPADM

## 2020-07-10 RX ORDER — SODIUM CHLORIDE 0.9 % (FLUSH) 0.9 %
10 SYRINGE (ML) INJECTION PRN
Status: DISCONTINUED | OUTPATIENT
Start: 2020-07-10 | End: 2020-07-10 | Stop reason: HOSPADM

## 2020-07-10 RX ORDER — FENTANYL CITRATE 50 UG/ML
INJECTION, SOLUTION INTRAMUSCULAR; INTRAVENOUS PRN
Status: DISCONTINUED | OUTPATIENT
Start: 2020-07-10 | End: 2020-07-10 | Stop reason: SDUPTHER

## 2020-07-10 RX ORDER — PROPOFOL 10 MG/ML
INJECTION, EMULSION INTRAVENOUS PRN
Status: DISCONTINUED | OUTPATIENT
Start: 2020-07-10 | End: 2020-07-10 | Stop reason: SDUPTHER

## 2020-07-10 RX ORDER — SODIUM CHLORIDE, SODIUM LACTATE, POTASSIUM CHLORIDE, CALCIUM CHLORIDE 600; 310; 30; 20 MG/100ML; MG/100ML; MG/100ML; MG/100ML
INJECTION, SOLUTION INTRAVENOUS CONTINUOUS
Status: DISCONTINUED | OUTPATIENT
Start: 2020-07-10 | End: 2020-07-10 | Stop reason: HOSPADM

## 2020-07-10 RX ORDER — MIDAZOLAM HYDROCHLORIDE 1 MG/ML
INJECTION INTRAMUSCULAR; INTRAVENOUS PRN
Status: DISCONTINUED | OUTPATIENT
Start: 2020-07-10 | End: 2020-07-10 | Stop reason: SDUPTHER

## 2020-07-10 RX ADMIN — PROPOFOL 100 MG: 10 INJECTION, EMULSION INTRAVENOUS at 08:28

## 2020-07-10 RX ADMIN — MIDAZOLAM 2 MG: 1 INJECTION INTRAMUSCULAR; INTRAVENOUS at 08:29

## 2020-07-10 RX ADMIN — PROPOFOL 50 MG: 10 INJECTION, EMULSION INTRAVENOUS at 08:32

## 2020-07-10 RX ADMIN — SODIUM CHLORIDE, POTASSIUM CHLORIDE, SODIUM LACTATE AND CALCIUM CHLORIDE: 600; 310; 30; 20 INJECTION, SOLUTION INTRAVENOUS at 06:25

## 2020-07-10 RX ADMIN — PROPOFOL 50 MG: 10 INJECTION, EMULSION INTRAVENOUS at 08:35

## 2020-07-10 RX ADMIN — LIDOCAINE HYDROCHLORIDE 60 MG: 20 INJECTION, SOLUTION EPIDURAL; INFILTRATION; INTRACAUDAL; PERINEURAL at 08:28

## 2020-07-10 RX ADMIN — PROPOFOL 20 MG: 10 INJECTION, EMULSION INTRAVENOUS at 08:40

## 2020-07-10 RX ADMIN — FENTANYL CITRATE 50 MCG: 50 INJECTION INTRAMUSCULAR; INTRAVENOUS at 08:34

## 2020-07-10 RX ADMIN — FENTANYL CITRATE 50 MCG: 50 INJECTION INTRAMUSCULAR; INTRAVENOUS at 08:36

## 2020-07-10 ASSESSMENT — PAIN - FUNCTIONAL ASSESSMENT: PAIN_FUNCTIONAL_ASSESSMENT: 0-10

## 2020-07-10 ASSESSMENT — PAIN SCALES - GENERAL
PAINLEVEL_OUTOF10: 0
PAINLEVEL_OUTOF10: 0

## 2020-07-10 NOTE — ANESTHESIA PRE PROCEDURE
Department of Anesthesiology  Preprocedure Note       Name:  Jimmy Pedro   Age:  36 y.o.  :  1980                                          MRN:  [id number]         Date:  7/10/2020      Surgeon: Kian Christopher):  Marla Woods MD    Procedure: Procedure(s):  COLONOSCOPY DIAGNOSTIC    Medications prior to admission:   Prior to Admission medications    Medication Sig Start Date End Date Taking?  Authorizing Provider   bisacodyl (BISACODYL) 5 MG EC tablet Take 4 tablets once for colonoscopy prep 20  Yes EVANGELIST Dickerson CNP   levothyroxine (SYNTHROID) 25 MCG tablet Take 1 tablet by mouth Daily 20  Yes Vida Kawasaki, MD   ibuprofen (ADVIL;MOTRIN) 800 MG tablet Take 1 tablet by mouth daily as needed for Pain TAKES PRN 20  Yes Gely Fernando PA-C   allopurinol (ZYLOPRIM) 300 MG tablet Take 1 tablet by mouth daily 20   Vida Kawasaki, MD   divalproex (DEPAKOTE ER) 500 MG extended release tablet Take 1 tablet by mouth nightly Indications: 3 tabs by mouth at bedtime 20   Vida Kawasaki, MD       Current medications:    Current Facility-Administered Medications   Medication Dose Route Frequency Provider Last Rate Last Dose    lactated ringers infusion   Intravenous Continuous EVANGELIST Dickerson CNP 75 mL/hr at 07/10/20 5799      sodium chloride flush 0.9 % injection 10 mL  10 mL Intravenous 2 times per day EVANGELIST Dickerson CNP        sodium chloride flush 0.9 % injection 10 mL  10 mL Intravenous PRN EVANGELIST Dickerson CNP           Allergies:  No Known Allergies    Problem List:    Patient Active Problem List   Diagnosis Code    History of colonic polyps Z86.010    Seiz[address] ([facility] R56.9    Gout M10.9    Other specified hypothyroidism E03.8       Past Medical History:        Diagnosis Date    Ep[address]ia ([facility]     Gout     Seizure disorder ([facility] [address] [facility]     Thyroid disease        Past Surgical History:        Procedure Laterality Date    APPENDECTOMY      COLONOSCOPY  2010    Polyp    COLONOSCOPY  11/2/15    colon polyp, diverticulosis, internal hemorrhoids       Social History:    Social History     Tobacco Use    Smoking status: Never Smoker    Smokeless tobacco: Never Used   Substance Use Topics    Alcohol use: Yes     Comment: OCCASIONAL                                Counseling given: Not Answered      Vital Signs (Current):   Vitals:    07/10/20 0614   BP: (!) 145/85   Pulse: 80   Resp: 16   Temp: 37 °C (98.6 °F)   TempSrc: Temporal   SpO2: 98%   Weight: 190 lb (86.2 kg)   Height: 5' 6\" (1.676 m)                                              BP Readings from Last 3 Encounters:   07/10/20 (!) 145/85   05/08/20 128/84   12/13/19 122/89       NPO Status: Time of last liquid consumption: 0500(am meds)                        Time of last solid consumption: 1900                        Date of last liquid consumption: 07/10/20                        Date of last solid food consumption: 07/08/20    BMI:   Wt Readings from Last 3 Encounters:   07/10/20 190 lb (86.2 kg)   05/08/20 203 lb 8 oz (92.3 kg)   12/13/19 203 lb 6.4 oz (92.3 kg)     Body mass index is 30.67 kg/m². CBC:   Lab Results   Component Value Date    WBC 5.3 08/14/2019    RBC 4.70 08/14/2019    HGB 14.3 08/14/2019    HCT 41.8 08/14/2019    MCV 88.9 08/14/2019    RDW 13.1 08/14/2019     08/14/2019       CMP:   Lab Results   Component Value Date     08/14/2019    K 4.0 08/14/2019    CL 96 08/14/2019    CO2 25 08/14/2019    BUN 17 08/14/2019    CREATININE 0.8 08/14/2019    GFRAA >60 08/14/2019    AGRATIO 2.3 08/14/2019    LABGLOM >60 08/14/2019    GLUCOSE 106 08/14/2019    PROT 6.9 12/13/2019    CALCIUM 10.0 08/14/2019    BILITOT 0.3 12/13/2019    ALKPHOS 87 12/13/2019    AST 27 12/13/2019    ALT 20 12/13/2019       POC Tests: No results for input(s): POCGLU, POCNA, POCK, POCCL, POCBUN, POCHEMO, POCHCT in the last 72 hours.     Coags: No results found for: PROTIME, INR, APTT    HCG (If Applicable): No results found for: PREGTESTUR, PREGSERUM, HCG, HCGQUANT     ABGs: No results found for: PHART, PO2ART, LAE6PVD, LIQ7IFK, BEART, P7XPHLIO     Type & Screen (If Applicable):  No results found for: LABABO, LABRH    Drug/Infectious Status (If Applicable):  No results found for: HIV, HEPCAB    COVID-19 Screening (If Applicable):   Lab Results   Component Value Date    COVID19 NOT DETECTED 07/06/2020         Anesthesia Evaluation  Patient summary reviewed  Airway: Mallampati: II        Dental:          Pulmonary:                              Cardiovascular:                      Neuro/Psych:   (+) seizures: well controlled,             GI/Hepatic/Renal:             Endo/Other:    (+) hypothyroidism::., .                 Abdominal:           Vascular:                                        Anesthesia Plan      MAC     ASA 3       Induction: intravenous. Anesthetic plan and risks discussed with patient. Plan discussed with attending.                   Russ Poster, APRN - CRNA   7/10/2020

## 2020-07-10 NOTE — H&P
GENERAL SURGERY OUTPATIENT HISTORY & PHYSICAL NOTE - ENDOSCOPY  [facility]    PATIENT: Laurent Shelley 1980, 36 y.o., male  MRN: [id number]    Physician: Quentin Flores MD    Date: 7/10/20    Reason for Evaluation:  Surveillance colonoscopy     Requesting Physician:  FELICIANO Ziegler     CHIEF COMPLAINT:  Need for Surveillance colonoscopy     History Obtained From:  patient, electronic medical record    HISTORY OF PRESENT ILLNESS:    Dannielle Geiger is a 36 y.o. male presenting for Surveillance colonoscopy. He has been having history of colon polyps, diverticulosis    He has completed the bowel prep. Workup Includes:    Previous colonoscopy (or flexible sigmoidoscopy): Dr. Santino Ibarra on 2015 revealing adenomatous colon polyp removed from his transverse colon, left sided diverticulosis and non-bleeding internal hemorrhoids    Of Note:    Family history of colon cancer: His paternal uncle  of colon cancer at age 37   Taking anticoagulants: No. Held for the procedure today N/A    Past Medical History:    Past Medical History:   Diagnosis Date    Ep[address] ([facility] [address]     Gout     Seizure disorder ([facility] [address]     Thyroid disease        Past Surgical History:    Past Surgical History:   Procedure Laterality Date    APPENDECTOMY      COLONOSCOPY      Polyp    COLONOSCOPY  11/2/15    colon polyp, diverticulosis, internal hemorrhoids       Current Medications:   Current Facility-Administered Medications   Medication Dose Route Frequency Provider Last Rate Last Dose    lactated ringers infusion   Intravenous Continuous Shira Coil, APRN - CNP 75 mL/hr at 07/10/20 0359      sodium chloride flush 0.9 % injection 10 mL  10 mL Intravenous 2 times per day Shira Coil, APRN - CNP        sodium chloride flush 0.9 % injection 10 mL  10 mL Intravenous PRN Shira Coil, APRN - CNP           Allergies:  Patient has no known allergies.     Social History:   Social History Socioeconomic History    Marital status:      Spouse name: None    Number of children: None    Years of education: None    Highest education level: None   Occupational History    None   Social Needs    Financial resource strain: None    Food insecurity     Worry: None     Inability: None    Transportation needs     Medical: None     Non-medical: None   Tobacco Use    Smoking status: Never Smoker    Smokeless tobacco: Never Used   Substance and Sexual Activity    Alcohol use: Yes     Comment: OCCASIONAL    Drug use: No    Sexual activity: None   Lifestyle    Physical activity     Days per week: None     Minutes per session: None    Stress: None   Relationships    Social connections     Talks on phone: None     Gets together: None     Attends Worship service: None     Active member of club or organization: None     Attends meetings of clubs or organizations: None     Relationship status: None    Intimate partner violence     Fear of current or ex partner: None     Emotionally abused: None     Physically abused: None     Forced sexual activity: None   Other Topics Concern    None   Social History Narrative    None       Family History:   Family History   Problem Relation Age of Onset    Arthritis Mother     Lupus Mother     Diabetes Father     Coronary Art Dis Father     Colon Cancer Paternal Uncle        REVIEW OF SYSTEMS:    Review of Systems   Constitutional: Negative for appetite change, chills, fatigue, fever and unexpected weight change. HENT: Negative for ear pain, hearing loss. Positive for tinnitus.    Eyes: Negative for pain and visual disturbance. Respiratory: Negative for cough, shortness of breath and wheezing.    Cardiovascular: Negative for chest pain, palpitations and leg swelling. Gastrointestinal: Negative for abdominal pain, blood in stool, constipation, nausea and vomiting. Endocrine: Negative for cold intolerance and heat intolerance.    Genitourinary: Negative for dysuria, frequency and urgency. Musculoskeletal: Negative of back and neck pain. Negative for myalgias.   Skin: Negative for color change, pallor and rash. Allergic/Immunologic: Negative for environmental allergies and food allergies. Neurological: Negative for dizziness, seizures and headaches. Hematological: Does not bruise/bleed easily. Psychiatric/Behavioral: Negative for dysphoric mood, and suicidal ideas. The patient is not nervous/anxious. I have reviewed the patient's information pertinent to this visit, including medical history, family history, social history and review of systems. PHYSICAL EXAM:    Vitals:    07/10/20 0614   BP: (!) 145/85   Pulse: 80   Resp: 16   Temp: 98.6 °F (37 °C)   TempSrc: Temporal   SpO2: 98%   Weight: 190 lb (86.2 kg)   Height: 5' 6\" (1.676 m)     Physical Exam  Constitutional:       General: He is not in acute distress. Appearance: He is well-developed. He is not diaphoretic. HENT:      Head: Normocephalic and atraumatic. Eyes:      General:         Right eye: No discharge. Left eye: No discharge. Pupils: Pupils are equal, round, and reactive to light. Neck:      Musculoskeletal: Neck supple. Trachea: No tracheal deviation. Cardiovascular:      Rate and Rhythm: Normal rate and regular rhythm. Pulmonary:      Effort: Pulmonary effort is normal. No respiratory distress. Breath sounds: No wheezing. Abdominal:      General: There is no distension. Palpations: Abdomen is soft. Tenderness: There is no abdominal tenderness. There is no guarding or rebound. Musculoskeletal:         General: No tenderness or deformity. Skin:     General: Skin is warm and dry. Findings: No rash. Neurological:      Mental Status: He is alert and oriented to person, place, and time.    Psychiatric:         Behavior: Behavior normal.         DATA:    Lab Results   Component Value Date    WBC 5.3 08/14/2019    HGB 14.3 08/14/2019    HCT 41.8 08/14/2019     08/14/2019     08/14/2019    K 4.0 08/14/2019    CL 96 (L) 08/14/2019    CO2 25 08/14/2019    BUN 17 08/14/2019    CREATININE 0.8 (L) 08/14/2019    GLUCOSE 106 (H) 08/14/2019    CALCIUM 10.0 08/14/2019    PROT 6.9 12/13/2019    BILITOT 0.3 12/13/2019    AST 27 12/13/2019    ALT 20 12/13/2019    ALKPHOS 87 12/13/2019    LABA1C 5.4 08/14/2019       Imaging:   No results found. Pertinent laboratory and imaging studies were personally reviewed if available. IMPRESSION:    Jing Fox is a 36 y.o. male presenting for Surveillance colonoscopy   No diagnosis found. Patient Active Problem List    Diagnosis Date Noted    Other specified hypothyroidism 05/08/2020    Gout     Seizure ([facility] [address] 12/19/2018    History of colonic polyps      PLAN:  · Surveillance colonoscopy  today. · The risks, benefits, potential complications and possible alternatives of the procedure were discussed in detail, including complications of but not limited to infection, bleeding, anesthesia-related complications, death, perforation, or need for additional interventions. All questions were answered. The patient wished to proceed and consent was documented in the medical record. The patient was counseled at length about the risks of ivet Covid-19 during their perioperative period and any recovery window from their procedure. The patient was made aware that ivet Covid-19  may worsen their prognosis for recovering from their procedure  and lend to a higher morbidity and/or mortality risk. All material risks, benefits, and reasonable alternatives including postponing the procedure were discussed. The patient does wish to proceed with the procedure at this time.   Ambulatory COVID-19 negative       Electronically signed by Cee Jain MD, 7/10/2020, 8:17 AM

## 2020-07-10 NOTE — BRIEF OP NOTE
Brief Postoperative Note      Patient: Gui Tena  YOB: 1980  MRN: [id number]    Date of Procedure: 7/10/2020    Pre-Op Diagnosis: H/O COLON POLYPS FAMILY H/O COLON CA    Post-Op Diagnosis: Same       Procedure(s):  COLONOSCOPY POLYPECTOMY SNARE/COLD BIOPSY    Surgeon(s):  Yaneth Ambriz MD    Assistant:  * No surgical staff found *    Anesthesia: Monitor Anesthesia Care    Estimated Blood Loss (mL): Minimal    Complications: None    Specimens:   ID Type Source Tests Collected by Time Destination   A :  Tissue Colon SURGICAL PATHOLOGY Yaneth Ambriz MD 7/10/2020 2927        Implants:  * No implants in log *      Drains: * No LDAs found *    Findings: SEE OP NOTE (MD REPORTS): CHART REVIEW --> PROCEDURES     Electronically signed by Jayden Cohen MD on 7/10/2020 at 8:54 AM

## 2020-07-10 NOTE — ANESTHESIA POSTPROCEDURE EVALUATION
Department of Anesthesiology  Postprocedure Note    Patient: Boubacar Vigil  MRN: [id number]  YOB: 1980  Date of evaluation: 7/10/2020  Time:  8:55 AM     Procedure Summary     Date:  07/10/20 Room / Location:  [address] [place] [address]    Anesthesia Start:  0491 Anesthesia Stop:  6139    Procedure:  COLONOSCOPY POLYPECTOMY SNARE/COLD BIOPSY (N/A ) Diagnosis:  (H/O COLON POLYPS FAMILY H/O COLON CA)    Surgeon:  Lambert Pulliam MD Responsible Provider:  EVANGELIST Jamison CRNA    Anesthesia Type:  MAC ASA Status:  3          Anesthesia Type: MAC    Zuhair Phase I:      Zuhair Phase II:      Last vitals: Reviewed and per EMR flowsheets.        Anesthesia Post Evaluation    Patient location during evaluation: bedside  Patient participation: complete - patient participated  Level of consciousness: sleepy but conscious  Pain score: 0  Nausea & Vomiting: no nausea and no vomiting  Complications: no  Respiratory status: acceptable  Hydration status: euvolemic

## 2020-07-16 ENCOUNTER — TELEPHONE (OUTPATIENT)
Dept: GASTROENTEROLOGY | Age: 40
End: 2020-07-16

## 2020-07-16 ENCOUNTER — TELEMEDICINE (OUTPATIENT)
Dept: GASTROENTEROLOGY | Age: 40
End: 2020-07-16
Payer: COMMERCIAL

## 2020-07-16 PROCEDURE — 99212 OFFICE O/P EST SF 10 MIN: CPT | Performed by: NURSE PRACTITIONER

## 2020-07-16 PROCEDURE — 1036F TOBACCO NON-USER: CPT | Performed by: NURSE PRACTITIONER

## 2020-07-16 PROCEDURE — G8427 DOCREV CUR MEDS BY ELIG CLIN: HCPCS | Performed by: NURSE PRACTITIONER

## 2020-07-16 PROCEDURE — G8417 CALC BMI ABV UP PARAM F/U: HCPCS | Performed by: NURSE PRACTITIONER

## 2020-07-16 NOTE — TELEPHONE ENCOUNTER
Please ensure patient is on recall roster for repeat colonoscopy in 5 years instead of 10 years due to hx of adenomatous colon polyp, father with multiple colon polyps and family hx of colon cancer. Thank you.

## 2020-07-16 NOTE — PROGRESS NOTES
bruise/bleed easily. Psychiatric/Behavioral: Negative for dysphoric mood, and suicidal ideas. The patient is not nervous/anxious.              Prior to Visit Medications    Medication Sig Taking? Authorizing Provider   bisacodyl (BISACODYL) 5 MG EC tablet Take 4 tablets once for colonoscopy prep  EVANGELIST Argueta - CNP   levothyroxine (SYNTHROID) 25 MCG tablet Take 1 tablet by mouth Daily  Shaista Tatum MD   allopurinol (ZYLOPRIM) 300 MG tablet Take 1 tablet by mouth daily  Shaista Tatum MD   divalproex (DEPAKOTE ER) 500 MG extended release tablet Take 1 tablet by mouth nightly Indications: 3 tabs by mouth at bedtime  Shaista Tatum MD   ibuprofen (ADVIL;MOTRIN) 800 MG tablet Take 1 tablet by mouth daily as needed for Pain TAKES PRN  Peggy Flynn PA-C       Social History     Tobacco Use    Smoking status: Never Smoker    Smokeless tobacco: Never Used   Substance Use Topics    Alcohol use: Yes     Comment: OCCASIONAL    Drug use: No        No Known Allergies    PHYSICAL EXAMINATION:      Constitutional: [x] Appears well-developed and well-nourished [x] No apparent distress      [] Abnormal-   Mental status  [x] Alert and awake  [x] Oriented to person/place/time [x]Able to follow commands      Eyes:  EOM    [x]  Normal  [] Abnormal-  Sclera  []  Normal  [] Abnormal -         Discharge []  None visible  [] Abnormal -    HENT:   [x] Normocephalic, atraumatic.   [] Abnormal   [] Mouth/Throat: Mucous membranes are moist.     External Ears [x] Normal  [] Abnormal-     Neck: [x] No visualized mass     Pulmonary/Chest: [x] Respiratory effort normal.  [x] No visualized signs of difficulty breathing or respiratory distress        [] Abnormal-      Musculoskeletal:   [x] Normal gait with no signs of ataxia         [] Normal range of motion of neck        [] Abnormal-       Neurological:        [x] No Facial Asymmetry (Cranial nerve 7 motor function) (limited exam to video visit)          [] No gaze palsy        [] Abnormal-         Skin:        [x] No significant exanthematous lesions or discoloration noted on facial skin         [] Abnormal-            Psychiatric:       [x] Normal Affect [] No Hallucinations        [] Abnormal-         ASSESSMENT/PLAN:  1. History of adenomatous colon polyp (colonoscopy done by Dr. Arcenio Shaikh on 11-2-2015 revealing adenomatous colon polyp removed from his transverse colon, left sided diverticulosis and non-bleeding internal hemorrhoids). I would recommend repeat colonoscopy for colorectal cancer evaluation in 5 years. 2.  Diverticulosis without bleeding and no episodes of diverticulitis. Recommend eating high fiber diet, plenty of water and good bowel regimen. 3.  Family history of colon cancer and father with history of multiple colon polyps would recommend repeat colonoscopy in 5 years. 4.  The patient was encouraged to follow-up as needed. Bev Brumfield is a 36 y.o. male being evaluated by a Virtual Visit (video visit) encounter to address concerns as mentioned above. A caregiver was present when appropriate. Due to this being a TeleHealth encounter (During [place] [address] public health emergency), evaluation of the following organ systems was limited: Vitals/Constitutional/EENT/Resp/CV/GI//MS/Neuro/Skin/Heme-Lymph-Imm. Pursuant to the emergency declaration under the [address] [place] [address], [address] [place] [address] authority and the [employer] and [employer] Act, this Virtual Visit was conducted with patient's (and/or legal guardian's) consent, to reduce the patient's risk of exposure to COVID-19 and provide necessary medical care. The patient (and/or legal guardian) has also been advised to contact this office for worsening conditions or problems, and seek emergency medical treatment and/or call 911 if deemed necessary.      Patient identification was verified at the start of the visit: Yes he verified his full

## 2020-10-06 RX ORDER — ALLOPURINOL 300 MG/1
TABLET ORAL
Qty: 30 TABLET | Refills: 0 | Status: SHIPPED | OUTPATIENT
Start: 2020-10-06 | End: 2020-11-02 | Stop reason: SDUPTHER

## 2020-10-06 NOTE — TELEPHONE ENCOUNTER
Requested Prescriptions     Signed Prescriptions Disp Refills    allopurinol (ZYLOPRIM) 300 MG tablet 30 tablet 0     Sig: TAKE 1 TABLET BY MOUTH EVERY DAY     Authorizing Provider: Rahel Joyce     Ordering User: Nidia Mckeon

## 2020-11-02 RX ORDER — ALLOPURINOL 300 MG/1
300 TABLET ORAL DAILY
Qty: 30 TABLET | Refills: 5 | Status: SHIPPED | OUTPATIENT
Start: 2020-11-02 | End: 2021-05-20 | Stop reason: SDUPTHER

## 2020-12-11 ENCOUNTER — OFFICE VISIT (OUTPATIENT)
Dept: FAMILY MEDICINE CLINIC | Age: 40
End: 2020-12-11
Payer: COMMERCIAL

## 2020-12-11 VITALS
TEMPERATURE: 97.3 F | OXYGEN SATURATION: 98 % | HEART RATE: 61 BPM | SYSTOLIC BLOOD PRESSURE: 118 MMHG | HEIGHT: 66 IN | DIASTOLIC BLOOD PRESSURE: 78 MMHG | BODY MASS INDEX: 31.66 KG/M2 | WEIGHT: 197 LBS

## 2020-12-11 PROCEDURE — G8482 FLU IMMUNIZE ORDER/ADMIN: HCPCS | Performed by: PHYSICIAN ASSISTANT

## 2020-12-11 PROCEDURE — 1036F TOBACCO NON-USER: CPT | Performed by: PHYSICIAN ASSISTANT

## 2020-12-11 PROCEDURE — G8417 CALC BMI ABV UP PARAM F/U: HCPCS | Performed by: PHYSICIAN ASSISTANT

## 2020-12-11 PROCEDURE — 90686 IIV4 VACC NO PRSV 0.5 ML IM: CPT | Performed by: PHYSICIAN ASSISTANT

## 2020-12-11 PROCEDURE — G8427 DOCREV CUR MEDS BY ELIG CLIN: HCPCS | Performed by: PHYSICIAN ASSISTANT

## 2020-12-11 PROCEDURE — 90471 IMMUNIZATION ADMIN: CPT | Performed by: PHYSICIAN ASSISTANT

## 2020-12-11 PROCEDURE — 99214 OFFICE O/P EST MOD 30 MIN: CPT | Performed by: PHYSICIAN ASSISTANT

## 2020-12-11 RX ORDER — DIVALPROEX SODIUM 500 MG/1
1500 TABLET, EXTENDED RELEASE ORAL NIGHTLY
Qty: 270 TABLET | Refills: 1 | Status: SHIPPED | OUTPATIENT
Start: 2020-12-11 | End: 2021-06-01

## 2020-12-11 RX ORDER — IBUPROFEN 800 MG/1
800 TABLET ORAL DAILY PRN
Qty: 120 TABLET | Refills: 0 | Status: SHIPPED | OUTPATIENT
Start: 2020-12-11 | End: 2021-03-05

## 2020-12-11 RX ORDER — LEVOTHYROXINE SODIUM 0.03 MG/1
25 TABLET ORAL
Qty: 90 TABLET | Refills: 1 | Status: SHIPPED | OUTPATIENT
Start: 2020-12-11 | End: 2021-09-24 | Stop reason: SDUPTHER

## 2020-12-11 ASSESSMENT — ENCOUNTER SYMPTOMS
ABDOMINAL PAIN: 0
BLOOD IN STOOL: 0
CONSTIPATION: 0
DIARRHEA: 0
SHORTNESS OF BREATH: 0
COUGH: 0

## 2021-01-18 ENCOUNTER — TELEMEDICINE (OUTPATIENT)
Dept: FAMILY MEDICINE CLINIC | Age: 41
End: 2021-01-18
Payer: COMMERCIAL

## 2021-01-18 DIAGNOSIS — R39.16 STRAINING ON URINATION: Primary | ICD-10-CM

## 2021-01-18 PROCEDURE — G8428 CUR MEDS NOT DOCUMENT: HCPCS | Performed by: PHYSICIAN ASSISTANT

## 2021-01-18 PROCEDURE — 99213 OFFICE O/P EST LOW 20 MIN: CPT | Performed by: PHYSICIAN ASSISTANT

## 2021-01-18 PROCEDURE — 81002 URINALYSIS NONAUTO W/O SCOPE: CPT | Performed by: PHYSICIAN ASSISTANT

## 2021-01-18 ASSESSMENT — ENCOUNTER SYMPTOMS
BLOOD IN STOOL: 0
DIARRHEA: 0
VOMITING: 0
CONSTIPATION: 0
NAUSEA: 0

## 2021-01-18 NOTE — PROGRESS NOTES
2021    TELEHEALTH EVALUATION -- Audio/Visual (During [id number] public health emergency)    HPI:    Russel Easton (:  1980) has requested an audio/video evaluation for the following concern(s):    Visit originally was suppose to be in office but patient wasn't going to make it so the schedulers switched to a virtual visit. Patient has complaints of discomfort with urination x several weeks. Only has discomfort when bladder gets full, no burning with urination. Notes difficulty getting stream started, feels like he has to strain to get urine out. No urgency, increased frequency, hematuria, flank pain, fevers/chills, penile discharge. Patient is not sexually active. Review of Systems   Constitutional: Negative for chills and fever. Gastrointestinal: Negative for blood in stool, constipation, diarrhea, nausea and vomiting. Genitourinary: Positive for difficulty urinating. Negative for discharge, dysuria, flank pain, frequency, hematuria, penile pain, penile swelling, scrotal swelling, testicular pain and urgency. Prior to Visit Medications    Medication Sig Taking? Authorizing Provider   divalproex (DEPAKOTE ER) 500 MG extended release tablet Take 3 tablets by mouth nightly Indications: 3 tabs by mouth at bedtime  Pam Vale PA-C   levothyroxine (SYNTHROID) 25 MCG tablet Take 1 tablet by mouth every morning (before breakfast) Take 30-60 minutes before eating or drinking  Pam Vale PA-C   ibuprofen (ADVIL;MOTRIN) 800 MG tablet Take 1 tablet by mouth daily as needed for Pain TAKES PRN  Pam Vale PA-C   allopurinol (ZYLOPRIM) 300 MG tablet Take 1 tablet by mouth daily TAKE 1 TABLET BY MOUTH EVERY DAY  Clay Phillips MD       Social History     Tobacco Use    Smoking status: Never Smoker    Smokeless tobacco: Never Used   Substance Use Topics    Alcohol use: Yes     Comment: OCCASIONAL    Drug use:  No            PHYSICAL EXAMINATION: [ INSTRUCTIONS:  \"[x]\" Indicates a positive item  \"[]\" Indicates a negative item  -- DELETE ALL ITEMS NOT EXAMINED]  Vital Signs: (As obtained by patient/caregiver or practitioner observation)    Blood pressure-    Heart rate-      Respiratory rate-      Temperature-    Pulse oximetry-     Constitutional:   [x] Appears well-developed and well-nourished   [x] No apparent distress    [] Abnormal-     Mental status  [x] Alert and awake    [x] Oriented to person/place/time   [x]Able to follow commands      Eyes:  EOM    [x]  Normal  [] Abnormal-  Sclera  [x]  Normal  [] Abnormal -  Discharge [x]  None visible    [] Abnormal -    HENT:   [x] Normocephalic, atraumatic. [x] Mouth/Throat: Mucous membranes are moist.    [] Abnormal     External Ears   [x] Normal    [] Abnormal-     Neck:   [x] No visualized mass     Pulmonary/Chest:   [x] Respiratory effort normal.    [x] No visualized signs of difficulty breathing or respiratory distress        [] Abnormal-      Musculoskeletal:     [] Normal gait with no signs of ataxia   [x] Normal range of motion of neck        [] Abnormal-       Neurological:          [x] No Facial Asymmetry (Cranial nerve 7 motor function) (limited exam to video visit)     [x] No gaze palsy        [] Abnormal-         Skin:         [x] No significant exanthematous lesions or discoloration noted on facial skin         [] Abnormal-            Psychiatric:         [x] Normal Affect   [x] No Hallucinations        [] Abnormal-     Other pertinent observable physical exam findings-       ASSESSMENT/PLAN:  1. Straining on urination  Get urinalysis and culture, further management pending results, if no etiology found will have to have patient come in for physical exam.  - POCT Urinalysis no Micro  - Culture, Urine      No follow-ups on file. Demetria Bush is a 36 y.o. male being evaluated by a Virtual Visit (video visit) encounter to address concerns as mentioned above. A caregiver was present when appropriate. Due to this being a TeleHealth encounter (During QIIQN-21 public health emergency), evaluation of the following organ systems was limited: Vitals/Constitutional/EENT/Resp/CV/GI//MS/Neuro/Skin/Heme-Lymph-Imm. Pursuant to the emergency declaration under the [address] [place] [address] and the [employer] and [employer] Act, this Virtual Visit was conducted with patient's (and/or legal guardian's) consent, to reduce the patient's risk of exposure to COVID-19 and provide necessary medical care. The patient (and/or legal guardian) has also been advised to contact this office for worsening conditions or problems, and seek emergency medical treatment and/or call 911 if deemed necessary. Services were provided through a video synchronous discussion virtually to substitute for in-person clinic visit. Patient and provider were located at their individual homes. --Kirby Burden PA-C on 1/18/2021 at 8:39 AM    An electronic signature was used to authenticate this note.

## 2021-01-22 DIAGNOSIS — R56.9 SEIZURE (HCC): ICD-10-CM

## 2021-01-22 DIAGNOSIS — Z13.220 LIPID SCREENING: ICD-10-CM

## 2021-01-22 DIAGNOSIS — M10.9 GOUT, UNSPECIFIED CAUSE, UNSPECIFIED CHRONICITY, UNSPECIFIED SITE: ICD-10-CM

## 2021-01-22 DIAGNOSIS — E03.9 HYPOTHYROIDISM, UNSPECIFIED TYPE: ICD-10-CM

## 2021-01-22 LAB
A/G RATIO: 2 (ref 1.1–2.2)
ALBUMIN SERPL-MCNC: 4.7 G/DL (ref 3.4–5)
ALP BLD-CCNC: 128 U/L (ref 40–129)
ALT SERPL-CCNC: 18 U/L (ref 10–40)
ANION GAP SERPL CALCULATED.3IONS-SCNC: 12 MMOL/L (ref 3–16)
AST SERPL-CCNC: 29 U/L (ref 15–37)
BASOPHILS ABSOLUTE: 0 K/UL (ref 0–0.2)
BASOPHILS RELATIVE PERCENT: 0.4 %
BILIRUB SERPL-MCNC: 0.3 MG/DL (ref 0–1)
BUN BLDV-MCNC: 12 MG/DL (ref 7–20)
CALCIUM SERPL-MCNC: 9.5 MG/DL (ref 8.3–10.6)
CHLORIDE BLD-SCNC: 98 MMOL/L (ref 99–110)
CHOLESTEROL, TOTAL: 257 MG/DL (ref 0–199)
CO2: 27 MMOL/L (ref 21–32)
CREAT SERPL-MCNC: 0.8 MG/DL (ref 0.9–1.3)
EOSINOPHILS ABSOLUTE: 0.1 K/UL (ref 0–0.6)
EOSINOPHILS RELATIVE PERCENT: 2.2 %
GFR AFRICAN AMERICAN: >60
GFR NON-AFRICAN AMERICAN: >60
GLOBULIN: 2.3 G/DL
GLUCOSE BLD-MCNC: 107 MG/DL (ref 70–99)
HCT VFR BLD CALC: 40.7 % (ref 40.5–52.5)
HDLC SERPL-MCNC: 48 MG/DL (ref 40–60)
HEMOGLOBIN: 13.7 G/DL (ref 13.5–17.5)
LDL CHOLESTEROL CALCULATED: 170 MG/DL
LYMPHOCYTES ABSOLUTE: 2.3 K/UL (ref 1–5.1)
LYMPHOCYTES RELATIVE PERCENT: 41 %
MCH RBC QN AUTO: 30.2 PG (ref 26–34)
MCHC RBC AUTO-ENTMCNC: 33.8 G/DL (ref 31–36)
MCV RBC AUTO: 89.4 FL (ref 80–100)
MONOCYTES ABSOLUTE: 0.4 K/UL (ref 0–1.3)
MONOCYTES RELATIVE PERCENT: 7.5 %
NEUTROPHILS ABSOLUTE: 2.8 K/UL (ref 1.7–7.7)
NEUTROPHILS RELATIVE PERCENT: 48.9 %
PDW BLD-RTO: 13.7 % (ref 12.4–15.4)
PLATELET # BLD: 211 K/UL (ref 135–450)
PMV BLD AUTO: 9.9 FL (ref 5–10.5)
POTASSIUM SERPL-SCNC: 4.6 MMOL/L (ref 3.5–5.1)
RBC # BLD: 4.55 M/UL (ref 4.2–5.9)
SODIUM BLD-SCNC: 137 MMOL/L (ref 136–145)
T4 FREE: 1 NG/DL (ref 0.9–1.8)
TOTAL PROTEIN: 7 G/DL (ref 6.4–8.2)
TRIGL SERPL-MCNC: 196 MG/DL (ref 0–150)
TSH SERPL DL<=0.05 MIU/L-ACNC: 2.83 UIU/ML (ref 0.27–4.2)
URIC ACID, SERUM: 9.2 MG/DL (ref 3.5–7.2)
VLDLC SERPL CALC-MCNC: 39 MG/DL
WBC # BLD: 5.6 K/UL (ref 4–11)

## 2021-01-24 LAB — URINE CULTURE, ROUTINE: NORMAL

## 2021-01-25 LAB
BILIRUBIN, POC: NORMAL
BLOOD URINE, POC: NORMAL
CLARITY, POC: NORMAL
COLOR, POC: YELLOW
GLUCOSE URINE, POC: NORMAL
KETONES, POC: NORMAL
LEUKOCYTE EST, POC: NORMAL
NITRITE, POC: NORMAL
PH, POC: 6.5
PROTEIN, POC: NORMAL
SPECIFIC GRAVITY, POC: 1.02
UROBILINOGEN, POC: NORMAL

## 2021-01-26 DIAGNOSIS — M10.9 GOUT, UNSPECIFIED CAUSE, UNSPECIFIED CHRONICITY, UNSPECIFIED SITE: Primary | ICD-10-CM

## 2021-01-26 RX ORDER — ALLOPURINOL 100 MG/1
100 TABLET ORAL DAILY
Qty: 30 TABLET | Refills: 0 | Status: SHIPPED | OUTPATIENT
Start: 2021-01-26 | End: 2021-02-17

## 2021-01-26 RX ORDER — COLCHICINE 0.6 MG/1
0.6 TABLET ORAL DAILY
Qty: 90 TABLET | Refills: 0 | Status: SHIPPED | OUTPATIENT
Start: 2021-01-26 | End: 2021-04-19

## 2021-01-27 ENCOUNTER — TELEPHONE (OUTPATIENT)
Dept: FAMILY MEDICINE CLINIC | Age: 41
End: 2021-01-27

## 2021-01-27 LAB
VALPROIC ACID % FREE: 6.7 % (ref 5–18.4)
VALPROIC ACID LEVEL: 21 UG/ML (ref 50–125)
VALPROIC ACID, FREE: 1.4 UG/ML (ref 7–23)
VALPROIC DATE LAST DOSE: ABNORMAL
VALPROIC DOSE AMOUNT: ABNORMAL
VALPROIC TIME LAST DOSE: ABNORMAL

## 2021-01-28 DIAGNOSIS — R56.9 SEIZURE (HCC): Primary | ICD-10-CM

## 2021-02-25 DIAGNOSIS — R56.9 SEIZURE (HCC): ICD-10-CM

## 2021-02-25 DIAGNOSIS — M10.9 GOUT, UNSPECIFIED CAUSE, UNSPECIFIED CHRONICITY, UNSPECIFIED SITE: ICD-10-CM

## 2021-02-25 LAB — URIC ACID, SERUM: 6.4 MG/DL (ref 3.5–7.2)

## 2021-03-03 LAB
VALPROIC ACID % FREE: 10.1 % (ref 5–18.4)
VALPROIC ACID LEVEL: 75 UG/ML (ref 50–125)
VALPROIC ACID, FREE: 7.6 UG/ML (ref 7–23)
VALPROIC DATE LAST DOSE: NORMAL
VALPROIC DOSE AMOUNT: NORMAL
VALPROIC TIME LAST DOSE: NORMAL

## 2021-03-05 RX ORDER — IBUPROFEN 800 MG/1
TABLET ORAL
Qty: 90 TABLET | Refills: 1 | Status: SHIPPED | OUTPATIENT
Start: 2021-03-05 | End: 2021-09-24 | Stop reason: SDUPTHER

## 2021-03-08 DIAGNOSIS — M10.9 GOUT, UNSPECIFIED CAUSE, UNSPECIFIED CHRONICITY, UNSPECIFIED SITE: Primary | ICD-10-CM

## 2021-04-18 DIAGNOSIS — M10.9 GOUT, UNSPECIFIED CAUSE, UNSPECIFIED CHRONICITY, UNSPECIFIED SITE: ICD-10-CM

## 2021-04-19 RX ORDER — COLCHICINE 0.6 MG/1
0.6 TABLET ORAL DAILY
Qty: 90 TABLET | Refills: 0 | Status: SHIPPED | OUTPATIENT
Start: 2021-04-19 | End: 2021-09-24

## 2021-05-20 RX ORDER — ALLOPURINOL 300 MG/1
300 TABLET ORAL DAILY
Qty: 30 TABLET | Refills: 2 | Status: SHIPPED | OUTPATIENT
Start: 2021-05-20 | End: 2021-09-24 | Stop reason: SDUPTHER

## 2021-05-30 DIAGNOSIS — R56.9 SEIZURE (HCC): ICD-10-CM

## 2021-06-01 RX ORDER — DIVALPROEX SODIUM 500 MG/1
TABLET, EXTENDED RELEASE ORAL
Qty: 270 TABLET | Refills: 1 | Status: SHIPPED | OUTPATIENT
Start: 2021-06-01 | End: 2021-09-24 | Stop reason: SDUPTHER

## 2021-09-24 ENCOUNTER — OFFICE VISIT (OUTPATIENT)
Dept: FAMILY MEDICINE CLINIC | Age: 41
End: 2021-09-24
Payer: COMMERCIAL

## 2021-09-24 VITALS
RESPIRATION RATE: 16 BRPM | OXYGEN SATURATION: 96 % | HEIGHT: 66 IN | SYSTOLIC BLOOD PRESSURE: 131 MMHG | BODY MASS INDEX: 31.02 KG/M2 | DIASTOLIC BLOOD PRESSURE: 78 MMHG | WEIGHT: 193 LBS | HEART RATE: 68 BPM

## 2021-09-24 DIAGNOSIS — E78.2 MODERATE MIXED HYPERLIPIDEMIA NOT REQUIRING STATIN THERAPY: ICD-10-CM

## 2021-09-24 DIAGNOSIS — R56.9 SEIZURE (HCC): ICD-10-CM

## 2021-09-24 DIAGNOSIS — Z23 NEED FOR VACCINATION: Primary | ICD-10-CM

## 2021-09-24 DIAGNOSIS — E03.9 HYPOTHYROIDISM, UNSPECIFIED TYPE: ICD-10-CM

## 2021-09-24 DIAGNOSIS — M10.9 GOUT, UNSPECIFIED CAUSE, UNSPECIFIED CHRONICITY, UNSPECIFIED SITE: ICD-10-CM

## 2021-09-24 PROCEDURE — 90471 IMMUNIZATION ADMIN: CPT | Performed by: STUDENT IN AN ORGANIZED HEALTH CARE EDUCATION/TRAINING PROGRAM

## 2021-09-24 PROCEDURE — 90674 CCIIV4 VAC NO PRSV 0.5 ML IM: CPT | Performed by: STUDENT IN AN ORGANIZED HEALTH CARE EDUCATION/TRAINING PROGRAM

## 2021-09-24 PROCEDURE — 99214 OFFICE O/P EST MOD 30 MIN: CPT | Performed by: STUDENT IN AN ORGANIZED HEALTH CARE EDUCATION/TRAINING PROGRAM

## 2021-09-24 RX ORDER — ALLOPURINOL 300 MG/1
300 TABLET ORAL DAILY
Qty: 90 TABLET | Refills: 1 | Status: SHIPPED | OUTPATIENT
Start: 2021-09-24 | End: 2022-03-16

## 2021-09-24 RX ORDER — IBUPROFEN 800 MG/1
TABLET ORAL
Qty: 90 TABLET | Refills: 1 | Status: SHIPPED | OUTPATIENT
Start: 2021-09-24 | End: 2022-03-16

## 2021-09-24 RX ORDER — COLCHICINE 0.6 MG/1
0.6 TABLET ORAL DAILY
Qty: 90 TABLET | Refills: 1 | Status: CANCELLED | OUTPATIENT
Start: 2021-09-24 | End: 2021-12-23

## 2021-09-24 RX ORDER — LEVOTHYROXINE SODIUM 0.03 MG/1
25 TABLET ORAL
Qty: 90 TABLET | Refills: 1 | Status: SHIPPED | OUTPATIENT
Start: 2021-09-24 | End: 2022-03-16

## 2021-09-24 RX ORDER — ALLOPURINOL 100 MG/1
100 TABLET ORAL DAILY
Qty: 90 TABLET | Refills: 1 | Status: SHIPPED | OUTPATIENT
Start: 2021-09-24 | End: 2022-03-16

## 2021-09-24 RX ORDER — DIVALPROEX SODIUM 500 MG/1
TABLET, EXTENDED RELEASE ORAL
Qty: 270 TABLET | Refills: 1 | Status: SHIPPED | OUTPATIENT
Start: 2021-09-24

## 2021-09-24 ASSESSMENT — ENCOUNTER SYMPTOMS
WHEEZING: 0
SORE THROAT: 0
SHORTNESS OF BREATH: 0
NAUSEA: 0
ABDOMINAL PAIN: 0

## 2021-09-24 NOTE — PROGRESS NOTES
10/3/2021    Kourtney Araujo    Chief Complaint   Patient presents with    6 Month Follow-Up     Presenting for 6 month follow up visit.  Flu Vaccine     Flu shot given       HPI  History was obtained from patient. Tabitha Doshi is a 39 y.o. male with a PMHx as listed below who presents today for 6-month follow-up. No acute complaints. Compliant with medications. Hx. Gout on allopurinol, and colchicine No gout attacks since 11/2020. Hx. Seizures on depakote last seizure over 5 years ago roughly. Per chart review Dr. Jimmy Black last suspected seizure was December 2018. Hx hypothyroidism prior labs wnl    Following with Neurology Dr. Jimmy Black    1. Need for vaccination    2. Gout, unspecified cause, unspecified chronicity, unspecified site    3. Seizure ([address]    4. Hypothyroidism, unspecified type    5. Other specified hypothyroidism    6. Moderate mixed hyperlipidemia not requiring statin therapy             REVIEW OF SYMPTOMS    Review of Systems   Constitutional: Negative for chills and fatigue. HENT: Negative for congestion and sore throat. Respiratory: Negative for shortness of breath and wheezing. Cardiovascular: Negative for chest pain and palpitations. Gastrointestinal: Negative for abdominal pain and nausea. Genitourinary: Negative for frequency and urgency. Neurological: Negative for light-headedness.        PAST MEDICAL HISTORY  Past Medical History:   Diagnosis Date    Epilepsia ([address]     Gout     Seizure disorder ([address]     Thyroid disease        FAMILY HISTORY  Family History   Problem Relation Age of Onset    Arthritis Mother     Lupus Mother     Diabetes Father     Coronary Art Dis Father     Colon Cancer Paternal Uncle        SOCIAL HISTORY  Social History     Socioeconomic History    Marital status:      Spouse name: None    Number of children: None    Years of education: None    Highest education level: None   Occupational History    None   Tobacco Use    Smoking NIGHTLY 270 tablet 1    ibuprofen (ADVIL;MOTRIN) 800 MG tablet TAKE 1 TABLET BY MOUTH EVERY DAY AS NEEDED FOR PAIN 90 tablet 1    levothyroxine (SYNTHROID) 25 MCG tablet Take 1 tablet by mouth every morning (before breakfast) Take 30-60 minutes before eating or drinking 90 tablet 1    colchicine (COLCRYS) 0.6 MG tablet TAKE 1 TABLET BY MOUTH DAILY FOR 3 MONTHS AFTER AN ALLOPURINOL DOSE ADJUSTMENT 90 tablet 0     No current facility-administered medications for this visit. ALLERGIES  No Known Allergies    PHYSICAL EXAM    /78   Pulse 68   Resp 16   Ht 5' 6\" (1.676 m)   Wt 193 lb (87.5 kg)   SpO2 96%   BMI 31.15 kg/m²     Physical Exam  Constitutional:       Appearance: Normal appearance. HENT:      Head: Normocephalic and atraumatic. Eyes:      Extraocular Movements: Extraocular movements intact. Pupils: Pupils are equal, round, and reactive to light. Cardiovascular:      Rate and Rhythm: Normal rate and regular rhythm. Pulses: Normal pulses. Heart sounds: No murmur heard. No friction rub. No gallop. Pulmonary:      Effort: Pulmonary effort is normal.      Breath sounds: Normal breath sounds. Skin:     General: Skin is warm and dry. Neurological:      General: No focal deficit present. Mental Status: He is alert. Psychiatric:         Mood and Affect: Mood normal.         Behavior: Behavior normal.         ASSESSMENT & PLAN    1. Gout, unspecified cause, unspecified chronicity, unspecified site  Stop colchicine recheck uric acid levels  -Well-controlled contact 11/20  - allopurinol (ZYLOPRIM) 100 MG tablet; Take 1 tablet by mouth daily In addition to allopurinol 300 mg tablet  Dispense: 90 tablet; Refill: 1  - allopurinol (ZYLOPRIM) 300 MG tablet; Take 1 tablet by mouth daily TAKE 1 TABLET BY MOUTH EVERY DAY  Dispense: 90 tablet;  Refill: 1  - ibuprofen (ADVIL;MOTRIN) 800 MG tablet; TAKE 1 TABLET BY MOUTH EVERY DAY AS NEEDED FOR PAIN  Dispense: 90 tablet; Refill: 1  - URIC ACID; Future    2. Seizure ([address]  -Continue to follow with Dr. Paige Ludwig  -Well controlled per chart review last suspected Seizure activity 12/2018  -Repeat Depakote level is therapeutic level  - divalproex (DEPAKOTE ER) 500 MG extended release tablet; TAKE 3 TABLETS BY MOUTH NIGHTLY  Dispense: 270 tablet; Refill: 1  - VALPROIC ACID LEVEL, FREE; Future    3. Hypothyroidism, unspecified type  -Well-controlled on current regimen  - levothyroxine (SYNTHROID) 25 MCG tablet; Take 1 tablet by mouth every morning (before breakfast) Take 30-60 minutes before eating or drinking  Dispense: 90 tablet; Refill: 1  - TSH WITH REFLEX TO FT4; Future    4. Need for vaccination  - INFLUENZA, MDCK QUADV, 2 YRS AND OLDER, IM, PF, PREFILL SYR OR SDV, 0.5ML (FLUCELVAX QUADV, PF)    6. Moderate mixed hyperlipidemia not requiring statin therapy  -Mild elevation in triglycerides otherwise well controlled currently not  On statin  - LIPID PANEL; Future      Return in about 6 months (around 3/24/2022).          Electronically signed by Gil Mancini DO on 10/3/2021 20

## 2021-10-03 PROBLEM — E03.9 HYPOTHYROIDISM: Status: ACTIVE | Noted: 2020-05-08

## 2021-11-11 ENCOUNTER — TELEPHONE (OUTPATIENT)
Dept: FAMILY MEDICINE CLINIC | Age: 41
End: 2021-11-11

## 2022-03-16 DIAGNOSIS — M10.9 GOUT, UNSPECIFIED CAUSE, UNSPECIFIED CHRONICITY, UNSPECIFIED SITE: ICD-10-CM

## 2022-03-16 DIAGNOSIS — E03.9 HYPOTHYROIDISM, UNSPECIFIED TYPE: ICD-10-CM

## 2022-03-16 RX ORDER — ALLOPURINOL 100 MG/1
100 TABLET ORAL DAILY
Qty: 90 TABLET | Refills: 1 | Status: SHIPPED | OUTPATIENT
Start: 2022-03-16 | End: 2022-09-12

## 2022-03-16 RX ORDER — LEVOTHYROXINE SODIUM 0.03 MG/1
25 TABLET ORAL
Qty: 90 TABLET | Refills: 1 | Status: SHIPPED | OUTPATIENT
Start: 2022-03-16

## 2022-03-16 RX ORDER — IBUPROFEN 800 MG/1
TABLET ORAL
Qty: 90 TABLET | Refills: 1 | Status: SHIPPED | OUTPATIENT
Start: 2022-03-16

## 2022-03-16 RX ORDER — ALLOPURINOL 300 MG/1
TABLET ORAL
Qty: 90 TABLET | Refills: 1 | Status: SHIPPED | OUTPATIENT
Start: 2022-03-16

## 2022-05-16 DIAGNOSIS — R56.9 SEIZURE (HCC): ICD-10-CM

## 2022-05-16 RX ORDER — DIVALPROEX SODIUM 500 MG/1
TABLET, EXTENDED RELEASE ORAL
Qty: 270 TABLET | Refills: 1 | OUTPATIENT
Start: 2022-05-16

## 2022-09-20 DIAGNOSIS — E03.9 HYPOTHYROIDISM, UNSPECIFIED TYPE: ICD-10-CM

## 2022-09-20 DIAGNOSIS — M10.9 GOUT, UNSPECIFIED CAUSE, UNSPECIFIED CHRONICITY, UNSPECIFIED SITE: ICD-10-CM

## 2022-09-20 RX ORDER — IBUPROFEN 800 MG/1
TABLET ORAL
Qty: 90 TABLET | Refills: 1 | OUTPATIENT
Start: 2022-09-20

## 2022-09-20 RX ORDER — LEVOTHYROXINE SODIUM 0.03 MG/1
25 TABLET ORAL
Qty: 90 TABLET | Refills: 1 | OUTPATIENT
Start: 2022-09-20

## 2022-09-20 RX ORDER — ALLOPURINOL 100 MG/1
100 TABLET ORAL DAILY
Qty: 90 TABLET | Refills: 1 | OUTPATIENT
Start: 2022-09-20 | End: 2023-03-19

## 2022-09-20 RX ORDER — ALLOPURINOL 300 MG/1
TABLET ORAL
Qty: 90 TABLET | Refills: 1 | OUTPATIENT
Start: 2022-09-20

## 2022-11-16 DIAGNOSIS — M10.9 GOUT, UNSPECIFIED CAUSE, UNSPECIFIED CHRONICITY, UNSPECIFIED SITE: ICD-10-CM

## 2022-11-16 DIAGNOSIS — E03.9 HYPOTHYROIDISM, UNSPECIFIED TYPE: ICD-10-CM

## 2022-11-16 RX ORDER — ALLOPURINOL 300 MG/1
TABLET ORAL
Qty: 90 TABLET | Refills: 1 | OUTPATIENT
Start: 2022-11-16

## 2022-11-16 RX ORDER — ALLOPURINOL 100 MG/1
100 TABLET ORAL DAILY
Qty: 90 TABLET | Refills: 1 | OUTPATIENT
Start: 2022-11-16 | End: 2023-05-15

## 2022-11-16 RX ORDER — IBUPROFEN 800 MG/1
TABLET ORAL
Qty: 90 TABLET | Refills: 1 | OUTPATIENT
Start: 2022-11-16

## 2022-11-16 RX ORDER — LEVOTHYROXINE SODIUM 0.03 MG/1
25 TABLET ORAL
Qty: 90 TABLET | Refills: 1 | OUTPATIENT
Start: 2022-11-16

## (undated) DEVICE — Z DISCONTINUED NO SUB IDED TUBING ETCO2 AD L6.5FT NSL ORAL CVD PRNG NONFLARED TIP OVR

## (undated) DEVICE — FORCEPS BX L240CM JAW DIA2.8MM L CAP W/ NDL MIC MESH TOOTH